# Patient Record
Sex: FEMALE | Race: WHITE | Employment: UNEMPLOYED | URBAN - METROPOLITAN AREA
[De-identification: names, ages, dates, MRNs, and addresses within clinical notes are randomized per-mention and may not be internally consistent; named-entity substitution may affect disease eponyms.]

---

## 2017-05-15 ENCOUNTER — APPOINTMENT (OUTPATIENT)
Dept: CT IMAGING | Age: 52
End: 2017-05-15
Attending: EMERGENCY MEDICINE
Payer: SELF-PAY

## 2017-05-15 ENCOUNTER — HOSPITAL ENCOUNTER (EMERGENCY)
Age: 52
Discharge: HOME | End: 2017-05-16
Attending: EMERGENCY MEDICINE | Admitting: EMERGENCY MEDICINE
Payer: SELF-PAY

## 2017-05-15 DIAGNOSIS — R11.10 VOMITING AND DIARRHEA: Primary | ICD-10-CM

## 2017-05-15 DIAGNOSIS — R19.7 VOMITING AND DIARRHEA: Primary | ICD-10-CM

## 2017-05-15 LAB
ALBUMIN SERPL BCP-MCNC: 4.5 G/DL (ref 3.5–5)
ALBUMIN/GLOB SERPL: 0.9 {RATIO} (ref 1.1–2.2)
ALP SERPL-CCNC: 97 U/L (ref 45–117)
ALT SERPL-CCNC: 48 U/L (ref 12–78)
ANION GAP BLD CALC-SCNC: 12 MMOL/L (ref 5–15)
AST SERPL W P-5'-P-CCNC: 28 U/L (ref 15–37)
BASOPHILS # BLD AUTO: 0 K/UL (ref 0–0.1)
BASOPHILS # BLD: 0 % (ref 0–1)
BILIRUB SERPL-MCNC: 0.4 MG/DL (ref 0.2–1)
BUN SERPL-MCNC: 17 MG/DL (ref 6–20)
BUN/CREAT SERPL: 18 (ref 12–20)
CALCIUM SERPL-MCNC: 9.5 MG/DL (ref 8.5–10.1)
CHLORIDE SERPL-SCNC: 100 MMOL/L (ref 97–108)
CO2 SERPL-SCNC: 26 MMOL/L (ref 21–32)
CREAT SERPL-MCNC: 0.94 MG/DL (ref 0.55–1.02)
EOSINOPHIL # BLD: 0 K/UL (ref 0–0.4)
EOSINOPHIL NFR BLD: 0 % (ref 0–7)
ERYTHROCYTE [DISTWIDTH] IN BLOOD BY AUTOMATED COUNT: 13.5 % (ref 11.5–14.5)
GLOBULIN SER CALC-MCNC: 5 G/DL (ref 2–4)
GLUCOSE SERPL-MCNC: 136 MG/DL (ref 65–100)
HCT VFR BLD AUTO: 53.9 % (ref 35–47)
HGB BLD-MCNC: 18.1 G/DL (ref 11.5–16)
LIPASE SERPL-CCNC: 162 U/L (ref 73–393)
LYMPHOCYTES # BLD AUTO: 11 % (ref 12–49)
LYMPHOCYTES # BLD: 0.8 K/UL (ref 0.8–3.5)
MCH RBC QN AUTO: 31.7 PG (ref 26–34)
MCHC RBC AUTO-ENTMCNC: 33.6 G/DL (ref 30–36.5)
MCV RBC AUTO: 94.4 FL (ref 80–99)
MONOCYTES # BLD: 0.2 K/UL (ref 0–1)
MONOCYTES NFR BLD AUTO: 2 % (ref 5–13)
NEUTS SEG # BLD: 6.5 K/UL (ref 1.8–8)
NEUTS SEG NFR BLD AUTO: 87 % (ref 32–75)
PLATELET # BLD AUTO: 311 K/UL (ref 150–400)
POTASSIUM SERPL-SCNC: 3.3 MMOL/L (ref 3.5–5.1)
PROT SERPL-MCNC: 9.5 G/DL (ref 6.4–8.2)
RBC # BLD AUTO: 5.71 M/UL (ref 3.8–5.2)
SODIUM SERPL-SCNC: 138 MMOL/L (ref 136–145)
WBC # BLD AUTO: 7.5 K/UL (ref 3.6–11)

## 2017-05-15 PROCEDURE — 74176 CT ABD & PELVIS W/O CONTRAST: CPT

## 2017-05-15 PROCEDURE — 96365 THER/PROPH/DIAG IV INF INIT: CPT

## 2017-05-15 PROCEDURE — 96375 TX/PRO/DX INJ NEW DRUG ADDON: CPT

## 2017-05-15 PROCEDURE — 96361 HYDRATE IV INFUSION ADD-ON: CPT

## 2017-05-15 PROCEDURE — 74011250636 HC RX REV CODE- 250/636: Performed by: EMERGENCY MEDICINE

## 2017-05-15 PROCEDURE — 83690 ASSAY OF LIPASE: CPT | Performed by: EMERGENCY MEDICINE

## 2017-05-15 PROCEDURE — 85025 COMPLETE CBC W/AUTO DIFF WBC: CPT | Performed by: EMERGENCY MEDICINE

## 2017-05-15 PROCEDURE — 99285 EMERGENCY DEPT VISIT HI MDM: CPT

## 2017-05-15 PROCEDURE — 74011250637 HC RX REV CODE- 250/637: Performed by: EMERGENCY MEDICINE

## 2017-05-15 PROCEDURE — 80053 COMPREHEN METABOLIC PANEL: CPT | Performed by: EMERGENCY MEDICINE

## 2017-05-15 PROCEDURE — 74011000250 HC RX REV CODE- 250: Performed by: EMERGENCY MEDICINE

## 2017-05-15 PROCEDURE — 36415 COLL VENOUS BLD VENIPUNCTURE: CPT | Performed by: EMERGENCY MEDICINE

## 2017-05-15 RX ORDER — FAMOTIDINE 20 MG/1
20 TABLET, FILM COATED ORAL 2 TIMES DAILY
Qty: 20 TAB | Refills: 0 | Status: SHIPPED | OUTPATIENT
Start: 2017-05-15 | End: 2017-05-25

## 2017-05-15 RX ORDER — PROMETHAZINE HYDROCHLORIDE 25 MG/1
25 TABLET ORAL
Qty: 12 TAB | Refills: 0 | Status: SHIPPED | OUTPATIENT
Start: 2017-05-15

## 2017-05-15 RX ORDER — FAMOTIDINE 10 MG/ML
20 INJECTION INTRAVENOUS
Status: DISCONTINUED | OUTPATIENT
Start: 2017-05-15 | End: 2017-05-15 | Stop reason: SDUPTHER

## 2017-05-15 RX ORDER — ONDANSETRON 4 MG/1
8 TABLET, ORALLY DISINTEGRATING ORAL
Status: COMPLETED | OUTPATIENT
Start: 2017-05-15 | End: 2017-05-15

## 2017-05-15 RX ADMIN — SODIUM CHLORIDE 1000 ML: 900 INJECTION, SOLUTION INTRAVENOUS at 21:25

## 2017-05-15 RX ADMIN — FAMOTIDINE 20 MG: 10 INJECTION, SOLUTION INTRAVENOUS at 21:27

## 2017-05-15 RX ADMIN — SODIUM CHLORIDE 1000 ML: 900 INJECTION, SOLUTION INTRAVENOUS at 23:34

## 2017-05-15 RX ADMIN — ONDANSETRON 8 MG: 4 TABLET, ORALLY DISINTEGRATING ORAL at 19:00

## 2017-05-15 RX ADMIN — PROMETHAZINE HYDROCHLORIDE 25 MG: 25 INJECTION INTRAMUSCULAR; INTRAVENOUS at 21:36

## 2017-05-15 NOTE — ED TRIAGE NOTES
She says she ate \"some bad chicken\" this morning and started vomiting about an hour later. She has also had some diarrhea. She does not sit up and is spitting into the emesis bag. She is hypertensive.

## 2017-05-16 VITALS
BODY MASS INDEX: 28.47 KG/M2 | TEMPERATURE: 98.5 F | HEART RATE: 122 BPM | SYSTOLIC BLOOD PRESSURE: 197 MMHG | HEIGHT: 60 IN | RESPIRATION RATE: 18 BRPM | DIASTOLIC BLOOD PRESSURE: 124 MMHG | OXYGEN SATURATION: 100 % | WEIGHT: 145 LBS

## 2017-05-16 LAB
ATRIAL RATE: 114 BPM
CALCULATED P AXIS, ECG09: 71 DEGREES
CALCULATED R AXIS, ECG10: 70 DEGREES
CALCULATED T AXIS, ECG11: 74 DEGREES
DIAGNOSIS, 93000: NORMAL
P-R INTERVAL, ECG05: 136 MS
Q-T INTERVAL, ECG07: 364 MS
QRS DURATION, ECG06: 76 MS
QTC CALCULATION (BEZET), ECG08: 501 MS
VENTRICULAR RATE, ECG03: 114 BPM

## 2017-05-16 PROCEDURE — 93005 ELECTROCARDIOGRAM TRACING: CPT

## 2017-05-16 NOTE — ED NOTES
Pt. States her pain is 7/10 and requests pain medication. Pt. Very drowsy. Dr. Elis Oswald aware of pt.'s request but due to her drowsy will not be ordering pain medication. Went to inform pt. And pt. Was asleep.

## 2017-05-16 NOTE — DISCHARGE INSTRUCTIONS
Diarrhea: Care Instructions  Your Care Instructions    Diarrhea is loose, watery stools (bowel movements). The exact cause is often hard to find. Sometimes diarrhea is your body's way of getting rid of what caused an upset stomach. Viruses, food poisoning, and many medicines can cause diarrhea. Some people get diarrhea in response to emotional stress, anxiety, or certain foods. Almost everyone has diarrhea now and then. It usually isn't serious, and your stools will return to normal soon. The important thing to do is replace the fluids you have lost, so you can prevent dehydration. The doctor has checked you carefully, but problems can develop later. If you notice any problems or new symptoms, get medical treatment right away. Follow-up care is a key part of your treatment and safety. Be sure to make and go to all appointments, and call your doctor if you are having problems. It's also a good idea to know your test results and keep a list of the medicines you take. How can you care for yourself at home? · Watch for signs of dehydration, which means your body has lost too much water. Dehydration is a serious condition and should be treated right away. Signs of dehydration are:  ¨ Increasing thirst and dry eyes and mouth. ¨ Feeling faint or lightheaded. ¨ Darker urine, and a smaller amount of urine than normal.  · To prevent dehydration, drink plenty of fluids, enough so that your urine is light yellow or clear like water. Choose water and other caffeine-free clear liquids until you feel better. If you have kidney, heart, or liver disease and have to limit fluids, talk with your doctor before you increase the amount of fluids you drink. · Begin eating small amounts of mild foods the next day, if you feel like it. ¨ Try yogurt that has live cultures of Lactobacillus. (Check the label.)  ¨ Avoid spicy foods, fruits, alcohol, and caffeine until 48 hours after all symptoms are gone.   ¨ Avoid chewing gum that contains sorbitol. ¨ Avoid dairy products (except for yogurt with Lactobacillus) while you have diarrhea and for 3 days after symptoms are gone. · The doctor may recommend that you take over-the-counter medicine, such as loperamide (Imodium), if you still have diarrhea after 6 hours. Read and follow all instructions on the label. Do not use this medicine if you have bloody diarrhea, a high fever, or other signs of serious illness. Call your doctor if you think you are having a problem with your medicine. When should you call for help? Call 911 anytime you think you may need emergency care. For example, call if:  · You passed out (lost consciousness). · Your stools are maroon or very bloody. Call your doctor now or seek immediate medical care if:  · You are dizzy or lightheaded, or you feel like you may faint. · Your stools are black and look like tar, or they have streaks of blood. · You have new or worse belly pain. · You have symptoms of dehydration, such as:  ¨ Dry eyes and a dry mouth. ¨ Passing only a little dark urine. ¨ Feeling thirstier than usual.  · You have a new or higher fever. Watch closely for changes in your health, and be sure to contact your doctor if:  · Your diarrhea is getting worse. · You see pus in the diarrhea. · You are not getting better after 2 days (48 hours). Where can you learn more? Go to http://madie-bryanna.info/. Enter Q800 in the search box to learn more about \"Diarrhea: Care Instructions. \"  Current as of: May 27, 2016  Content Version: 11.2  © 9365-2582 [employer]. Care instructions adapted under license by [employer] (which disclaims liability or warranty for this information). If you have questions about a medical condition or this instruction, always ask your healthcare professional. [address] any warranty or liability for your use of this information.          Nausea and Vomiting: Care Instructions  Your Care Instructions    When you are nauseated, you may feel weak and sweaty and notice a lot of saliva in your mouth. Nausea often leads to vomiting. Most of the time you do not need to worry about nausea and vomiting, but they can be signs of other illnesses. Two common causes of nausea and vomiting are stomach flu and food poisoning. Nausea and vomiting from viral stomach flu will usually start to improve within 24 hours. Nausea and vomiting from food poisoning may last from 12 to 48 hours. The doctor has checked you carefully, but problems can develop later. If you notice any problems or new symptoms, get medical treatment right away. Follow-up care is a key part of your treatment and safety. Be sure to make and go to all appointments, and call your doctor if you are having problems. It's also a good idea to know your test results and keep a list of the medicines you take. How can you care for yourself at home? · To prevent dehydration, drink plenty of fluids, enough so that your urine is light yellow or clear like water. Choose water and other caffeine-free clear liquids until you feel better. If you have kidney, heart, or liver disease and have to limit fluids, talk with your doctor before you increase the amount of fluids you drink. · Rest in bed until you feel better. · When you are able to eat, try clear soups, mild foods, and liquids until all symptoms are gone for 12 to 48 hours. Other good choices include dry toast, crackers, cooked cereal, and gelatin dessert, such as Jell-O. When should you call for help? Call 911 anytime you think you may need emergency care. For example, call if:  · You passed out (lost consciousness). Call your doctor now or seek immediate medical care if:  · You have symptoms of dehydration, such as:  ¨ Dry eyes and a dry mouth. ¨ Passing only a little dark urine. ¨ Feeling thirstier than usual.  · You have new or worsening belly pain.   · You have a new or higher fever. · You vomit blood or what looks like coffee grounds. Watch closely for changes in your health, and be sure to contact your doctor if:  · You have ongoing nausea and vomiting. · Your vomiting is getting worse. · Your vomiting lasts longer than 2 days. · You are not getting better as expected. Where can you learn more? Go to http://madie-bryanna.info/. Enter 25 [id number] in the search box to learn more about \"Nausea and Vomiting: Care Instructions. \"  Current as of: May 27, 2016  Content Version: 11.2  © 9127-9957 [employer]. Care instructions adapted under license by [employer] (which disclaims liability or warranty for this information). If you have questions about a medical condition or this instruction, always ask your healthcare professional. [address] any warranty or liability for your use of this information.

## 2017-05-16 NOTE — ED NOTES
Pt. Remains tachycardic with elevated BP. Pt. Still drowsy. Pt. Admits to using cocaine prior to coming to ED.  notified of elevated BP and HR. Dr. Ortiz Bile in to talk with pt.

## 2017-05-16 NOTE — ED PROVIDER NOTES
HPI Comments: 45 y.o. female with past medical history significant for HTN who presents from home with chief complaint of N/V/D. Pt reports she \"ate bad chicken\" earlier this morning and became ill w/ N/V/D and 8/10 epigastric abdominal pain 30 minutes later. She states she took [address] with no relief. Pt remained quiet after many questions. She denies hx of DM or cancer. There are no other acute medical concerns at this time. Note written by Peng Mclaughlin, as dictated by Kate Parra MD 8:48 PM    The history is provided by the patient. Past Medical History:   Diagnosis Date    Hypertension        History reviewed. No pertinent surgical history. No family history on file. Social History     Social History    Marital status: SINGLE     Spouse name: N/A    Number of children: N/A    Years of education: N/A     Occupational History    Not on file. Social History Main Topics    Smoking status: Light Tobacco Smoker    Smokeless tobacco: Not on file    Alcohol use No    Drug use: Not on file    Sexual activity: Not on file     Other Topics Concern    Not on file     Social History Narrative    No narrative on file         ALLERGIES: Compazine [prochlorperazine]    Review of Systems   Constitutional: Negative for fever. HENT: Negative for facial swelling. Eyes: Negative for visual disturbance. Respiratory: Negative for chest tightness. Cardiovascular: Negative for chest pain. Gastrointestinal: Positive for abdominal pain, diarrhea, nausea and vomiting. Genitourinary: Negative for dysuria. Musculoskeletal: Negative for arthralgias. Skin: Negative for rash. Neurological: Negative for dizziness. Hematological: Negative for adenopathy. Psychiatric/Behavioral: Negative for suicidal ideas. All other systems reviewed and are negative.       Vitals:    05/15/17 1821   BP: (!) 194/121   Pulse: 100   Resp: 24   Temp: 98.9 °F (37.2 °C)   SpO2: 97%   Weight: 65.8 kg (145 lb)   Height: 5' (1.524 m)            Physical Exam   Constitutional: She is oriented to person, place, and time. She appears well-developed and well-nourished. No distress. Dry heaving. HENT:   Head: Normocephalic and atraumatic. Mouth/Throat: Oropharynx is clear and moist.   Eyes: Pupils are equal, round, and reactive to light. No scleral icterus. Neck: Normal range of motion. Neck supple. No thyromegaly present. Cardiovascular: Regular rhythm, normal heart sounds and intact distal pulses. Tachycardia present. No murmur heard. Pulmonary/Chest: Effort normal and breath sounds normal. No respiratory distress. Abdominal: Soft. Bowel sounds are normal. She exhibits no distension. There is no tenderness. Musculoskeletal: Normal range of motion. She exhibits no edema. Neurological: She is alert and oriented to person, place, and time. Skin: Skin is warm and dry. No rash noted. She is not diaphoretic. Nursing note and vitals reviewed. Note written by Peng Mclaughlin, as dictated by Kate Parra MD 8:48 PM    MDM  Number of Diagnoses or Management Options  Diagnosis management comments: A:  Vomiting and diarrhea all day since eating chicken this AM.  Pt thinks she has food poisoning. Pt tachycardic and dry heaving. Soft abdomen. DDx; Food poisoning, gastroenteritis, obstruction, gastroparesis    P:  Labs  ivf  Phenergan (pt states this is what works for her). CT a/p    ED Course       Procedures    WBC=7.5 with some hemoconcentration. CMP unremarkable. Lipase normal    10:24 PM  Pt signed out to Dr. Harris Said pending repeat assessment following IVF and CT a/p.

## 2017-05-16 NOTE — ED NOTES
11:19 PM  Change of shift. Care of patient signed over to Dr. Vaughn Santos. Handoff complete. PROGRESS NOTE:  11:20 PM Pt is slightly drowsy but awakes to voice. Her nausea is better with a little abdominal pain. Per Dr. Riccardo Morrison, we will discharge her with a close follow up. PROGRESS NOTE:  12:44 AM The pt admits to using cocaine before presenting to the ED. PROGRESS NOTE:  12:57 AM I spoke with the pt and said that I will not give her BP medication since she took cocaine. I advised her to follow up with her PCP. She is refusing to leave and she said that she will call the police if we try to discharge her.

## 2017-05-16 NOTE — ED NOTES
On entering room to discharge pt. Pt. States \"I am not going home with my BP elevated. \" Dr. Thony Britton spoke with pt. Informing her that she needed to F/U with her PCP regarding her BP that he was not going to treat her BP ion the Ed with cocaine on board. Pt. Angry but dressed self and ambulated to waiting room to call for a ride.

## 2020-03-10 ENCOUNTER — APPOINTMENT (OUTPATIENT)
Dept: GENERAL RADIOLOGY | Age: 55
End: 2020-03-10
Attending: EMERGENCY MEDICINE
Payer: MEDICAID

## 2020-03-10 ENCOUNTER — APPOINTMENT (OUTPATIENT)
Dept: CT IMAGING | Age: 55
End: 2020-03-10
Attending: EMERGENCY MEDICINE
Payer: MEDICAID

## 2020-03-10 ENCOUNTER — HOSPITAL ENCOUNTER (OUTPATIENT)
Age: 55
Setting detail: OBSERVATION
LOS: 1 days | Discharge: HOME | End: 2020-03-13
Attending: EMERGENCY MEDICINE | Admitting: INTERNAL MEDICINE
Payer: MEDICAID

## 2020-03-10 DIAGNOSIS — R07.9 CHEST PAIN, UNSPECIFIED TYPE: Primary | ICD-10-CM

## 2020-03-10 DIAGNOSIS — R11.2 INTRACTABLE VOMITING WITH NAUSEA, UNSPECIFIED VOMITING TYPE: ICD-10-CM

## 2020-03-10 DIAGNOSIS — E87.6 HYPOKALEMIA: ICD-10-CM

## 2020-03-10 LAB
ALBUMIN SERPL-MCNC: 4.1 G/DL (ref 3.5–5)
ALBUMIN/GLOB SERPL: 0.9 {RATIO} (ref 1.1–2.2)
ALP SERPL-CCNC: 109 U/L (ref 45–117)
ALT SERPL-CCNC: 29 U/L (ref 12–78)
AMPHET UR QL SCN: NEGATIVE
ANION GAP SERPL CALC-SCNC: 5 MMOL/L (ref 5–15)
AST SERPL-CCNC: 23 U/L (ref 15–37)
BARBITURATES UR QL SCN: NEGATIVE
BASOPHILS # BLD: 0 K/UL (ref 0–0.1)
BASOPHILS NFR BLD: 0 % (ref 0–1)
BENZODIAZ UR QL: NEGATIVE
BILIRUB SERPL-MCNC: 0.3 MG/DL (ref 0.2–1)
BUN SERPL-MCNC: 15 MG/DL (ref 6–20)
BUN/CREAT SERPL: 18 (ref 12–20)
CALCIUM SERPL-MCNC: 9.5 MG/DL (ref 8.5–10.1)
CANNABINOIDS UR QL SCN: NEGATIVE
CHLORIDE SERPL-SCNC: 102 MMOL/L (ref 97–108)
CO2 SERPL-SCNC: 28 MMOL/L (ref 21–32)
COCAINE UR QL SCN: POSITIVE
COMMENT, HOLDF: NORMAL
CREAT SERPL-MCNC: 0.85 MG/DL (ref 0.55–1.02)
D DIMER PPP FEU-MCNC: 0.64 MG/L FEU (ref 0–0.65)
DIFFERENTIAL METHOD BLD: ABNORMAL
DRUG SCRN COMMENT,DRGCM: ABNORMAL
EOSINOPHIL # BLD: 0 K/UL (ref 0–0.4)
EOSINOPHIL NFR BLD: 0 % (ref 0–7)
ERYTHROCYTE [DISTWIDTH] IN BLOOD BY AUTOMATED COUNT: 13.6 % (ref 11.5–14.5)
GLOBULIN SER CALC-MCNC: 4.7 G/DL (ref 2–4)
GLUCOSE SERPL-MCNC: 156 MG/DL (ref 65–100)
HCT VFR BLD AUTO: 54.6 % (ref 35–47)
HGB BLD-MCNC: 17.2 G/DL (ref 11.5–16)
IMM GRANULOCYTES # BLD AUTO: 0 K/UL (ref 0–0.04)
IMM GRANULOCYTES NFR BLD AUTO: 1 % (ref 0–0.5)
LYMPHOCYTES # BLD: 2 K/UL (ref 0.8–3.5)
LYMPHOCYTES NFR BLD: 24 % (ref 12–49)
MCH RBC QN AUTO: 30.6 PG (ref 26–34)
MCHC RBC AUTO-ENTMCNC: 31.5 G/DL (ref 30–36.5)
MCV RBC AUTO: 97.2 FL (ref 80–99)
METHADONE UR QL: NEGATIVE
MONOCYTES # BLD: 0.3 K/UL (ref 0–1)
MONOCYTES NFR BLD: 4 % (ref 5–13)
NEUTS SEG # BLD: 6 K/UL (ref 1.8–8)
NEUTS SEG NFR BLD: 71 % (ref 32–75)
NRBC # BLD: 0 K/UL (ref 0–0.01)
NRBC BLD-RTO: 0 PER 100 WBC
OPIATES UR QL: NEGATIVE
PCP UR QL: NEGATIVE
PLATELET # BLD AUTO: 334 K/UL (ref 150–400)
PMV BLD AUTO: 9 FL (ref 8.9–12.9)
POTASSIUM SERPL-SCNC: 2.9 MMOL/L (ref 3.5–5.1)
PROT SERPL-MCNC: 8.8 G/DL (ref 6.4–8.2)
RBC # BLD AUTO: 5.62 M/UL (ref 3.8–5.2)
SAMPLES BEING HELD,HOLD: NORMAL
SODIUM SERPL-SCNC: 135 MMOL/L (ref 136–145)
TROPONIN I SERPL-MCNC: <0.05 NG/ML
TROPONIN I SERPL-MCNC: <0.05 NG/ML
UR CULT HOLD, URHOLD: NORMAL
WBC # BLD AUTO: 8.4 K/UL (ref 3.6–11)

## 2020-03-10 PROCEDURE — 93005 ELECTROCARDIOGRAM TRACING: CPT

## 2020-03-10 PROCEDURE — 74011250637 HC RX REV CODE- 250/637: Performed by: NURSE PRACTITIONER

## 2020-03-10 PROCEDURE — 74011250637 HC RX REV CODE- 250/637: Performed by: INTERNAL MEDICINE

## 2020-03-10 PROCEDURE — 80307 DRUG TEST PRSMV CHEM ANLYZR: CPT

## 2020-03-10 PROCEDURE — 74011250636 HC RX REV CODE- 250/636: Performed by: INTERNAL MEDICINE

## 2020-03-10 PROCEDURE — 74011000258 HC RX REV CODE- 258: Performed by: EMERGENCY MEDICINE

## 2020-03-10 PROCEDURE — 99285 EMERGENCY DEPT VISIT HI MDM: CPT

## 2020-03-10 PROCEDURE — 74011250636 HC RX REV CODE- 250/636: Performed by: EMERGENCY MEDICINE

## 2020-03-10 PROCEDURE — 96361 HYDRATE IV INFUSION ADD-ON: CPT

## 2020-03-10 PROCEDURE — 99218 HC RM OBSERVATION: CPT

## 2020-03-10 PROCEDURE — 85379 FIBRIN DEGRADATION QUANT: CPT

## 2020-03-10 PROCEDURE — 84484 ASSAY OF TROPONIN QUANT: CPT

## 2020-03-10 PROCEDURE — 74011000258 HC RX REV CODE- 258: Performed by: NURSE PRACTITIONER

## 2020-03-10 PROCEDURE — 36415 COLL VENOUS BLD VENIPUNCTURE: CPT

## 2020-03-10 PROCEDURE — 71045 X-RAY EXAM CHEST 1 VIEW: CPT

## 2020-03-10 PROCEDURE — 80053 COMPREHEN METABOLIC PANEL: CPT

## 2020-03-10 PROCEDURE — 96375 TX/PRO/DX INJ NEW DRUG ADDON: CPT

## 2020-03-10 PROCEDURE — 74011000250 HC RX REV CODE- 250: Performed by: NURSE PRACTITIONER

## 2020-03-10 PROCEDURE — 96365 THER/PROPH/DIAG IV INF INIT: CPT

## 2020-03-10 PROCEDURE — 96372 THER/PROPH/DIAG INJ SC/IM: CPT

## 2020-03-10 PROCEDURE — 96376 TX/PRO/DX INJ SAME DRUG ADON: CPT

## 2020-03-10 PROCEDURE — 74011250636 HC RX REV CODE- 250/636: Performed by: NURSE PRACTITIONER

## 2020-03-10 PROCEDURE — 74011250637 HC RX REV CODE- 250/637: Performed by: EMERGENCY MEDICINE

## 2020-03-10 PROCEDURE — 85025 COMPLETE CBC W/AUTO DIFF WBC: CPT

## 2020-03-10 RX ORDER — ENOXAPARIN SODIUM 100 MG/ML
1 INJECTION SUBCUTANEOUS EVERY 24 HOURS
Status: DISCONTINUED | OUTPATIENT
Start: 2020-03-10 | End: 2020-03-10

## 2020-03-10 RX ORDER — MORPHINE SULFATE 10 MG/ML
6 INJECTION, SOLUTION INTRAMUSCULAR; INTRAVENOUS
Status: COMPLETED | OUTPATIENT
Start: 2020-03-10 | End: 2020-03-10

## 2020-03-10 RX ORDER — ONDANSETRON 2 MG/ML
4 INJECTION INTRAMUSCULAR; INTRAVENOUS
Status: DISCONTINUED | OUTPATIENT
Start: 2020-03-10 | End: 2020-03-13 | Stop reason: HOSPADM

## 2020-03-10 RX ORDER — ONDANSETRON 2 MG/ML
4 INJECTION INTRAMUSCULAR; INTRAVENOUS
Status: COMPLETED | OUTPATIENT
Start: 2020-03-10 | End: 2020-03-10

## 2020-03-10 RX ORDER — METOPROLOL TARTRATE 5 MG/5ML
5 INJECTION INTRAVENOUS ONCE
Status: DISCONTINUED | OUTPATIENT
Start: 2020-03-10 | End: 2020-03-10

## 2020-03-10 RX ORDER — ASPIRIN 325 MG
325 TABLET ORAL
Status: COMPLETED | OUTPATIENT
Start: 2020-03-10 | End: 2020-03-10

## 2020-03-10 RX ORDER — AMLODIPINE BESYLATE 5 MG/1
10 TABLET ORAL
Status: COMPLETED | OUTPATIENT
Start: 2020-03-10 | End: 2020-03-10

## 2020-03-10 RX ORDER — POTASSIUM CHLORIDE 7.45 MG/ML
10 INJECTION INTRAVENOUS
Status: COMPLETED | OUTPATIENT
Start: 2020-03-10 | End: 2020-03-10

## 2020-03-10 RX ORDER — DILTIAZEM HYDROCHLORIDE 5 MG/ML
10 INJECTION INTRAVENOUS ONCE
Status: COMPLETED | OUTPATIENT
Start: 2020-03-11 | End: 2020-03-10

## 2020-03-10 RX ORDER — ENOXAPARIN SODIUM 100 MG/ML
40 INJECTION SUBCUTANEOUS EVERY 24 HOURS
Status: DISCONTINUED | OUTPATIENT
Start: 2020-03-11 | End: 2020-03-13 | Stop reason: HOSPADM

## 2020-03-10 RX ORDER — KETOROLAC TROMETHAMINE 30 MG/ML
30 INJECTION, SOLUTION INTRAMUSCULAR; INTRAVENOUS
Status: COMPLETED | OUTPATIENT
Start: 2020-03-10 | End: 2020-03-10

## 2020-03-10 RX ORDER — MORPHINE SULFATE 2 MG/ML
2 INJECTION, SOLUTION INTRAMUSCULAR; INTRAVENOUS ONCE
Status: COMPLETED | OUTPATIENT
Start: 2020-03-10 | End: 2020-03-10

## 2020-03-10 RX ORDER — CARVEDILOL 6.25 MG/1
6.25 TABLET ORAL 2 TIMES DAILY WITH MEALS
Status: DISCONTINUED | OUTPATIENT
Start: 2020-03-10 | End: 2020-03-13

## 2020-03-10 RX ORDER — SODIUM CHLORIDE 0.9 % (FLUSH) 0.9 %
10 SYRINGE (ML) INJECTION
Status: ACTIVE | OUTPATIENT
Start: 2020-03-10 | End: 2020-03-11

## 2020-03-10 RX ORDER — DIAZEPAM 10 MG/2ML
5 INJECTION INTRAMUSCULAR
Status: DISCONTINUED | OUTPATIENT
Start: 2020-03-10 | End: 2020-03-13 | Stop reason: HOSPADM

## 2020-03-10 RX ORDER — HYDRALAZINE HYDROCHLORIDE 20 MG/ML
10 INJECTION INTRAMUSCULAR; INTRAVENOUS
Status: DISCONTINUED | OUTPATIENT
Start: 2020-03-10 | End: 2020-03-10

## 2020-03-10 RX ORDER — POTASSIUM CHLORIDE 750 MG/1
40 TABLET, FILM COATED, EXTENDED RELEASE ORAL
Status: COMPLETED | OUTPATIENT
Start: 2020-03-10 | End: 2020-03-10

## 2020-03-10 RX ORDER — CLONIDINE 0.2 MG/24H
1 PATCH, EXTENDED RELEASE TRANSDERMAL
Status: DISCONTINUED | OUTPATIENT
Start: 2020-03-10 | End: 2020-03-13 | Stop reason: HOSPADM

## 2020-03-10 RX ORDER — SODIUM CHLORIDE 9 MG/ML
125 INJECTION, SOLUTION INTRAVENOUS CONTINUOUS
Status: DISCONTINUED | OUTPATIENT
Start: 2020-03-10 | End: 2020-03-12

## 2020-03-10 RX ADMIN — KETOROLAC TROMETHAMINE 30 MG: 30 INJECTION, SOLUTION INTRAMUSCULAR at 12:14

## 2020-03-10 RX ADMIN — SODIUM CHLORIDE 125 ML/HR: 900 INJECTION, SOLUTION INTRAVENOUS at 18:34

## 2020-03-10 RX ADMIN — HYDRALAZINE HYDROCHLORIDE 10 MG: 20 INJECTION INTRAMUSCULAR; INTRAVENOUS at 19:27

## 2020-03-10 RX ADMIN — CARVEDILOL 6.25 MG: 6.25 TABLET, FILM COATED ORAL at 17:13

## 2020-03-10 RX ADMIN — SODIUM CHLORIDE 1000 ML: 900 INJECTION, SOLUTION INTRAVENOUS at 15:33

## 2020-03-10 RX ADMIN — ASPIRIN 325 MG ORAL TABLET 325 MG: 325 PILL ORAL at 11:09

## 2020-03-10 RX ADMIN — MORPHINE SULFATE 2 MG: 2 INJECTION, SOLUTION INTRAMUSCULAR; INTRAVENOUS at 23:07

## 2020-03-10 RX ADMIN — POTASSIUM CHLORIDE 40 MEQ: 750 TABLET, FILM COATED, EXTENDED RELEASE ORAL at 13:39

## 2020-03-10 RX ADMIN — ONDANSETRON 4 MG: 2 INJECTION, SOLUTION INTRAMUSCULAR; INTRAVENOUS at 11:07

## 2020-03-10 RX ADMIN — NITROGLYCERIN 1 INCH: 20 OINTMENT TOPICAL at 23:40

## 2020-03-10 RX ADMIN — ONDANSETRON 4 MG: 2 INJECTION INTRAMUSCULAR; INTRAVENOUS at 19:27

## 2020-03-10 RX ADMIN — PROMETHAZINE HYDROCHLORIDE 25 MG: 25 INJECTION INTRAMUSCULAR; INTRAVENOUS at 15:44

## 2020-03-10 RX ADMIN — POTASSIUM CHLORIDE 10 MEQ: 10 INJECTION, SOLUTION INTRAVENOUS at 12:14

## 2020-03-10 RX ADMIN — AMLODIPINE BESYLATE 10 MG: 5 TABLET ORAL at 17:13

## 2020-03-10 RX ADMIN — DILTIAZEM HYDROCHLORIDE 10 MG/HR: 5 INJECTION INTRAVENOUS at 23:19

## 2020-03-10 RX ADMIN — DILTIAZEM HYDROCHLORIDE 10 MG: 5 INJECTION INTRAVENOUS at 23:18

## 2020-03-10 RX ADMIN — MORPHINE SULFATE 6 MG: 10 INJECTION INTRAVENOUS at 15:36

## 2020-03-10 NOTE — ROUTINE PROCESS
TRANSFER - OUT REPORT: 
 
Verbal report given to Carlos Guardado RN (name) on Tommie Watson  being transferred to 4W room 437 (unit) for routine progression of care Report consisted of patients Situation, Background, Assessment and  
Recommendations(SBAR). Information from the following report(s) SBAR and ED Summary was reviewed with the receiving nurse. Lines:    
 
Opportunity for questions and clarification was provided. Patient transported with: 
 [employer]

## 2020-03-10 NOTE — ROUTINE PROCESS
1845: Patient arrived from ER. Had a large emesis. Checked Vitals. Patient's BP is 174/120. Notified Dr. Jaswinder Dubon through [facility]. Awaiting for new orders. 1900: Patient sleeping, will hold antiemetic for now. 1915: Spoke with JOSE ARMANDO Larios and received new order of PRN hydralazine. 1927: PRN hydralazine and zofran given. 2008: BP rechecked, 160/103. Bedside and Verbal shift change report given to Norman Murphy (oncoming nurse) by Rosalba Kebede (offgoing nurse). Report included the following information SBAR, Kardex, Intake/Output, MAR, Recent Results, Med Rec Status and Cardiac Rhythm ST.

## 2020-03-10 NOTE — ED TRIAGE NOTES
Triage Note: Patient is coming in from EMS with complaints of chest pressure since 0600 this morning. Patient received 1 SL NTG and 1 inch NTP en route to the hospital. Patient has elevated blood pressure but has not taken medicine this morning. Patient is a poor historian. Pt also admits to recent cocaine use.

## 2020-03-10 NOTE — ED PROVIDER NOTES
53 y.o. female with past medical history significant for HTN, and MI who presents via EMS with chief complaint of chest pain. Pt complains of sudden onset L chest pain that feels like pressure, and radiates to both of her underarms, that began this morning at 06:00. Her pain is worse with palpation, and she states this pain feels similar to her previous MI 6 years ago. Pt does not follow with a cardiologist at this time, but states she was seen for her MI at [facility]. She also endorses nausea, vomiting, shortness of breath, lightheadedness, dizziness, and urinary frequency. She denies leg pain, and leg swelling. She has a history of cocaine abuse, and states she last used it 2 days ago, and she does not use it regularly. Pt was hypertensive and diaphoretic for EMS, with pressure in the 221'W systolic, but was improved slightly with nitropaste. She did not receive aspirin form EMS. Pt is on amlodipine for her hypertension, and last took it yesterday. There are no other acute medical concerns at this time. Social hx: endorses tobacco smoking; denies EtOH use; endorses cocaine use. PCP: Rayna Jorgensen MD      Note written by Peng Zhu, as dictated by Veronica Warner MD 10:29 AM      The history is provided by the patient and the EMS personnel. No  was used. Past Medical History:   Diagnosis Date    Hypertension        History reviewed. No pertinent surgical history. History reviewed. No pertinent family history.     Social History     Socioeconomic History    Marital status: SINGLE     Spouse name: Not on file    Number of children: Not on file    Years of education: Not on file    Highest education level: Not on file   Occupational History    Not on file   Social Needs    Financial resource strain: Not on file    Food insecurity:     Worry: Not on file     Inability: Not on file    Transportation needs:     Medical: Not on file     Non-medical: Not on file Tobacco Use    Smoking status: Light Tobacco Smoker    Smokeless tobacco: Never Used   Substance and Sexual Activity    Alcohol use: No    Drug use: Not on file    Sexual activity: Not on file   Lifestyle    Physical activity:     Days per week: Not on file     Minutes per session: Not on file    Stress: Not on file   Relationships    Social connections:     Talks on phone: Not on file     Gets together: Not on file     Attends Yazidism service: Not on file     Active member of club or organization: Not on file     Attends meetings of clubs or organizations: Not on file     Relationship status: Not on file    Intimate partner violence:     Fear of current or ex partner: Not on file     Emotionally abused: Not on file     Physically abused: Not on file     Forced sexual activity: Not on file   Other Topics Concern    Not on file   Social History Narrative    Not on file         ALLERGIES: Compazine [prochlorperazine]    Review of Systems   Constitutional: Negative for chills and fever. HENT: Negative for rhinorrhea and sore throat. Respiratory: Positive for shortness of breath. Negative for cough. Cardiovascular: Positive for chest pain. Negative for leg swelling. Gastrointestinal: Positive for nausea. Negative for abdominal pain and diarrhea. Genitourinary: Positive for frequency. Negative for dysuria and urgency. Musculoskeletal: Negative for arthralgias and back pain. Skin: Negative for rash. Neurological: Positive for dizziness and light-headedness. Negative for weakness. Vitals:    03/10/20 1026   BP: (!) 148/97   Pulse: 79   Resp: 20   SpO2: 97%            Physical Exam     Vital signs reviewed. Nursing notes reviewed.     Const:  No acute distress, well developed, well nourished, uncomfortable appearing, pt retching during exam  Head:  Atraumatic, normocephalic  Eyes:  PERRL, conjunctiva normal, no scleral icterus  Neck:  Supple, trachea midline  Cardiovascular:  RRR, no murmurs, no gallops, no rubs  Resp:  No resp distress, no increased work of breathing, no wheezes, no rhonchi, no rales,  Abd:  Soft, non-tender, non-distended, no rebound, no guarding, no CVA tenderness  MSK:  No pedal edema, normal ROM, tenderness to palpation over L chest wall  Neuro:  Alert and oriented x3, no cranial nerve defect  Skin:  Warm, dry, intact  Psych: normal mood and affect, behavior is normal, judgement and thought content is normal    Note written by Alvie Spatz, Scribe, as dictated by Angie Munoz MD 10:36 AM    MDM  Number of Diagnoses or Management Options  Chest pain, unspecified type:   Hypokalemia:   Intractable vomiting with nausea, unspecified vomiting type:      Amount and/or Complexity of Data Reviewed  Clinical lab tests: ordered and reviewed  Tests in the radiology section of CPT®: ordered and reviewed  Review and summarize past medical records: yes    Patient Progress  Patient progress: stable          Ms. Pato Caldera is a 50-year-old female who presents to the ER with chest pain and intractable nausea vomiting. Patient found to have hyperkalemia. She continues to have pain in her chest here. She said that it feels like when she had an MI in the past.  First troponin is negative. She continues to be tachycardic with a heart rate in the 1 teens. Her d-dimer is elevated. We will try to get several CTs of her chest but her IVs of blood in each time. She has a history of difficult IV access. I will give her a dose of Lovenox here. I spoke with the hospitalist who agrees to evaluate her for admission.       Procedures

## 2020-03-10 NOTE — H&P
Brief:            H and P     CC:    Pt with cp and + cocain in screen    HPI: PT not participating in history ; only form ED notes and subsequent ed course is info gathered    \"53 y.o. female with past medical history significant for HTN, and MI who presents via EMS with chief complaint of chest pain. Pt complains of sudden onset L chest pain that feels like pressure, and radiates to both of her underarms, that began this morning at 06:00. Her pain is worse with palpation, and she states this pain feels similar to her previous MI 6 years ago. Pt does not follow with a cardiologist at this time, but states she was seen for her MI at [facility] [place]. She also endorses nausea, vomiting, shortness of breath, lightheadedness, dizziness, and urinary frequency. She denies leg pain, and leg swelling. She has a history of cocaine abuse, and states she last used it 2 days ago, and she does not use it regularly. Pt was hypertensive and diaphoretic for EMS, with pressure in the 220'B systolic, but was improved slightly with nitropaste. She did not receive aspirin form EMS. Pt is on amlodipine for her hypertension, and last took it yesterday. There are no other acute medical concerns at this time.     Social hx: endorses tobacco smoking; denies EtOH use; endorses cocaine use. \"     Pt up and walking x 2 in ED  with BP  in the 200 PLUS  range and pulse 120 range in ED going to bathroom to void. ECG and CXr and Troponin's do not point to major organ injury    At this junction symptom relief of N/V, CP and VS control,       Pt is a poor historian but claims last cocaine 2 days ago. Prior to Admission Med List  Prior to Admission Medications   Prescriptions Last Dose Informant Patient Reported? Taking?   promethazine (PHENERGAN) 25 mg tablet   No No   Sig: Take 1 Tab by mouth every six (6) hours as needed.       Facility-Administered Medications: None       SH:  Social History     Socioeconomic History    Marital status: SINGLE Spouse name: Not on file    Number of children: Not on file    Years of education: Not on file    Highest education level: Not on file   Occupational History    Not on file   Social Needs    Financial resource strain: Not on file    Food insecurity     Worry: Not on file     Inability: Not on file    Transportation needs     Medical: Not on file     Non-medical: Not on file   Tobacco Use    Smoking status: Light Tobacco Smoker    Smokeless tobacco: Never Used   Substance and Sexual Activity    Alcohol use: No    Drug use: Not on file    Sexual activity: Not on file   Lifestyle    Physical activity     Days per week: Not on file     Minutes per session: Not on file    Stress: Not on file   Relationships    Social connections     Talks on phone: Not on file     Gets together: Not on file     Attends Religion service: Not on file     Active member of club or organization: Not on file     Attends meetings of clubs or organizations: Not on file     Relationship status: Not on file    Intimate partner violence     Fear of current or ex partner: Not on file     Emotionally abused: Not on file     Physically abused: Not on file     Forced sexual activity: Not on file   Other Topics Concern    Not on file   Social History Narrative    Not on file          PMH:     Past Medical History:   Diagnosis Date    Hypertension         Medicine PTA:     Medications Prior to Admission   Medication Sig    promethazine (PHENERGAN) 25 mg tablet Take 1 Tab by mouth every six (6) hours as needed.          ROS:   Review of Systems   Reason unable to perform ROS: loopy, not answering questions except for a few words and these are not corrherrent          PE:     Visit Vitals  /88 (BP 1 Location: Left arm, BP Patient Position: At rest)   Pulse 87   Temp 98.2 °F (36.8 °C)   Resp 16   Wt 63.5 kg (140 lb)   SpO2 100%   BMI 27.34 kg/m²        Physical Exam  Constitutional:       Appearance: She is ill-appearing and toxic-appearing. She is not diaphoretic. HENT:      Head: Normocephalic and atraumatic. Mouth/Throat:      Mouth: Mucous membranes are dry. Pharynx: Oropharynx is clear. Eyes:      Extraocular Movements: Extraocular movements intact. Pupils: Pupils are equal, round, and reactive to light. Neck:      Musculoskeletal: Normal range of motion and neck supple. Cardiovascular:      Rate and Rhythm: Normal rate and regular rhythm. Pulmonary:      Effort: Pulmonary effort is normal.      Breath sounds: Normal breath sounds. Abdominal:      General: Abdomen is flat. Bowel sounds are normal.      Palpations: Abdomen is soft. Musculoskeletal:         General: No swelling or tenderness. Skin:     General: Skin is warm and dry. Neurological:      Comments: No focality, pt was up waking to bathroom x 2 during ED stay ( but not willing to participate in history w this writer             Data:   Lab Results   Component Value Date/Time    WBC 8.4 03/10/2020 10:55 AM    HGB 17.2 (H) 03/10/2020 10:55 AM    HCT 54.6 (H) 03/10/2020 10:55 AM    PLATELET 962 25/92/6628 10:55 AM    MCV 97.2 03/10/2020 10:55 AM        Lab Results   Component Value Date/Time    Sodium 135 (L) 03/10/2020 10:55 AM    Potassium 2.9 (L) 03/10/2020 10:55 AM    Chloride 102 03/10/2020 10:55 AM    CO2 28 03/10/2020 10:55 AM    Anion gap 5 03/10/2020 10:55 AM    Glucose 156 (H) 03/10/2020 10:55 AM    BUN 15 03/10/2020 10:55 AM    Creatinine 0.85 03/10/2020 10:55 AM    BUN/Creatinine ratio 18 03/10/2020 10:55 AM    GFR est AA >60 03/10/2020 10:55 AM    GFR est non-AA >60 03/10/2020 10:55 AM    Calcium 9.5 03/10/2020 10:55 AM    Bilirubin, total 0.3 03/10/2020 10:55 AM    AST (SGOT) 23 03/10/2020 10:55 AM    Alk.  phosphatase 109 03/10/2020 10:55 AM    Protein, total 8.8 (H) 03/10/2020 10:55 AM    Albumin 4.1 03/10/2020 10:55 AM    Globulin 4.7 (H) 03/10/2020 10:55 AM    A-G Ratio 0.9 (L) 03/10/2020 10:55 AM    ALT (SGPT) 29 03/10/2020 10:55 AM        EKG Results     Procedure 720 Value Units Date/Time    EKG, 12 LEAD, INITIAL [[id number]] Collected:  03/10/20 2239    Order Status:  Completed Updated:  03/10/20 2241     Ventricular Rate 117 BPM      Atrial Rate 117 BPM      P-R Interval 146 ms      QRS Duration 74 ms      Q-T Interval 366 ms      QTC Calculation (Bezet) 510 ms      Calculated P Axis 70 degrees      Calculated R Axis 52 degrees      Calculated T Axis 72 degrees      Diagnosis --     Sinus tachycardia  Biatrial enlargement  Nonspecific ST abnormality  When compared with ECG of 10-MAR-2020 10:37,  No significant change was found      EKG 12 LEAD INITIAL [[id number]] Collected:  03/10/20 1037    Order Status:  Completed Updated:  03/10/20 1040     Ventricular Rate 88 BPM      Atrial Rate 88 BPM      P-R Interval 134 ms      QRS Duration 86 ms      Q-T Interval 400 ms      QTC Calculation (Bezet) 484 ms      Calculated P Axis 60 degrees      Calculated R Axis 20 degrees      Calculated T Axis 79 degrees      Diagnosis --     Normal sinus rhythm with sinus arrhythmia  Biatrial enlargement  Prolonged QT  When compared with ECG of 16-MAY-2017 00:47,  premature ventricular complexes are no longer present      EKG, 12 LEAD, INITIAL [[id number]]     Order Status:  Canceled         XR Results (most recent):  Results from Hospital Encounter encounter on 03/10/20   XR CHEST PORT    Narrative Portable chest single view dated 3/10/2020    History is chest pain    A single portable AP semierect view of the chest was obtained. The cardiac  silhouette is grossly normal in size. There is hyperaeration of the lungs. There  is no evidence of active lung disease. Impression IMPRESSION: No evidence of active lung disease.          Personal interpretation of CXR : unremarkable          Assessment/Plan:        Assessment:     · Cp,  for symptom control  (Neg troponin serials, unremarkable ECG,  02 stats on RA 98 % ( not likely PE) )  · Cocaine abuse, recent ,   · NV -  pt claims allergy  in CC to compazine, will use Zofran;  but pt received compazine in ED w/o ill effects  · Pt is a fall risk     Plan:   · Tele  · Coreg, Catapress, Norvasc   · Avoid hydralazine as do not want to precipitate coronary steal   · If BP conts to be elevated -- will consider iv CCB on IMCU  Level w bolus and drip at an allowed taper of if no taper option a cont steady dose as in bolus 10 mg Cardizem and start 10 mg per hour--not ordering now as just given Coreg/Catapress/and Norvasc (po/cutaeously). · Hydration bolus 1000 mg and ivf's to folllow  · Goodie bag  · TSH.  Free t4  · Zofran  · Lovenox for PPX DVT    Full note to follow

## 2020-03-11 ENCOUNTER — APPOINTMENT (OUTPATIENT)
Dept: NON INVASIVE DIAGNOSTICS | Age: 55
End: 2020-03-11
Attending: HOSPITALIST
Payer: MEDICAID

## 2020-03-11 LAB
ANION GAP SERPL CALC-SCNC: 9 MMOL/L (ref 5–15)
ATRIAL RATE: 117 BPM
ATRIAL RATE: 88 BPM
AV PEAK GRADIENT: 61.48 MMHG
AV VELOCITY RATIO: 0.74
BUN SERPL-MCNC: 29 MG/DL (ref 6–20)
BUN/CREAT SERPL: 30 (ref 12–20)
CALCIUM SERPL-MCNC: 9.5 MG/DL (ref 8.5–10.1)
CALCULATED P AXIS, ECG09: 60 DEGREES
CALCULATED P AXIS, ECG09: 70 DEGREES
CALCULATED R AXIS, ECG10: 20 DEGREES
CALCULATED R AXIS, ECG10: 52 DEGREES
CALCULATED T AXIS, ECG11: 72 DEGREES
CALCULATED T AXIS, ECG11: 79 DEGREES
CHLORIDE SERPL-SCNC: 105 MMOL/L (ref 97–108)
CK SERPL-CCNC: 129 U/L (ref 26–192)
CO2 SERPL-SCNC: 22 MMOL/L (ref 21–32)
CREAT SERPL-MCNC: 0.96 MG/DL (ref 0.55–1.02)
DIAGNOSIS, 93000: NORMAL
DIAGNOSIS, 93000: NORMAL
ECHO AO ROOT DIAM: 3.25 CM
ECHO AV AREA PEAK VELOCITY: 2.4 CM2
ECHO AV PEAK GRADIENT: 25.5 MMHG
ECHO AV PEAK VELOCITY: 252.71 CM/S
ECHO AV REGURGITANT PHT: 414.6 CM
ECHO EST RA PRESSURE: 5 MMHG
ECHO LA AREA 4C: 10.4 CM2
ECHO LA MAJOR AXIS: 2.79 CM
ECHO LA TO AORTIC ROOT RATIO: 0.86
ECHO LA VOL 2C: 31.12 ML (ref 22–52)
ECHO LA VOL 4C: 20.92 ML (ref 22–52)
ECHO LA VOL BP: 27.6 ML (ref 22–52)
ECHO LV E' LATERAL VELOCITY: 8.88 CM/S
ECHO LV E' SEPTAL VELOCITY: 6.31 CM/S
ECHO LV INTERNAL DIMENSION DIASTOLIC: 3.44 CM (ref 3.9–5.3)
ECHO LV INTERNAL DIMENSION SYSTOLIC: 2.33 CM
ECHO LV IVSD: 0.79 CM (ref 0.6–0.9)
ECHO LV MASS 2D: 113.1 G (ref 67–162)
ECHO LV POSTERIOR WALL DIASTOLIC: 1.23 CM (ref 0.6–0.9)
ECHO LVOT DIAM: 2.03 CM
ECHO LVOT PEAK GRADIENT: 14.1 MMHG
ECHO LVOT PEAK VELOCITY: 187.73 CM/S
ECHO MV A VELOCITY: 45.59 CM/S
ECHO MV AREA PHT: 2.1 CM2
ECHO MV E DECELERATION TIME (DT): 361 MS
ECHO MV E VELOCITY: 64.39 CM/S
ECHO MV E/A RATIO: 1.41
ECHO MV E/E' LATERAL: 7.25
ECHO MV E/E' RATIO (AVERAGED): 8.73
ECHO MV E/E' SEPTAL: 10.2
ECHO MV PRESSURE HALF TIME (PHT): 104.7 MS
ECHO PULMONARY ARTERY SYSTOLIC PRESSURE (PASP): 35.3 MMHG
ECHO PV MAX VELOCITY: 101.91 CM/S
ECHO PV PEAK GRADIENT: 4.2 MMHG
ECHO RIGHT VENTRICULAR SYSTOLIC PRESSURE (RVSP): 35.3 MMHG
ECHO RV INTERNAL DIMENSION: 3.51 CM
ECHO RV TAPSE: 2.48 CM (ref 1.5–2)
ECHO TV REGURGITANT MAX VELOCITY: 275.36 CM/S
ECHO TV REGURGITANT PEAK GRADIENT: 30.3 MMHG
ERYTHROCYTE [DISTWIDTH] IN BLOOD BY AUTOMATED COUNT: 13.9 % (ref 11.5–14.5)
GLUCOSE SERPL-MCNC: 115 MG/DL (ref 65–100)
HCT VFR BLD AUTO: 43.9 % (ref 35–47)
HGB BLD-MCNC: 14.1 G/DL (ref 11.5–16)
LVFS 2D: 32.33 %
MAGNESIUM SERPL-MCNC: 2 MG/DL (ref 1.6–2.4)
MCH RBC QN AUTO: 30.8 PG (ref 26–34)
MCHC RBC AUTO-ENTMCNC: 32.1 G/DL (ref 30–36.5)
MCV RBC AUTO: 95.9 FL (ref 80–99)
MV DEC SLOPE: 1.78
NRBC # BLD: 0 K/UL (ref 0–0.01)
NRBC BLD-RTO: 0 PER 100 WBC
P-R INTERVAL, ECG05: 134 MS
P-R INTERVAL, ECG05: 146 MS
PISA AR MAX VEL: 392.03 CM/S
PLATELET # BLD AUTO: 302 K/UL (ref 150–400)
PMV BLD AUTO: 8.9 FL (ref 8.9–12.9)
POTASSIUM SERPL-SCNC: 4.5 MMOL/L (ref 3.5–5.1)
Q-T INTERVAL, ECG07: 366 MS
Q-T INTERVAL, ECG07: 400 MS
QRS DURATION, ECG06: 74 MS
QRS DURATION, ECG06: 86 MS
QTC CALCULATION (BEZET), ECG08: 484 MS
QTC CALCULATION (BEZET), ECG08: 510 MS
RBC # BLD AUTO: 4.58 M/UL (ref 3.8–5.2)
SODIUM SERPL-SCNC: 136 MMOL/L (ref 136–145)
VENTRICULAR RATE, ECG03: 117 BPM
VENTRICULAR RATE, ECG03: 88 BPM
WBC # BLD AUTO: 10.4 K/UL (ref 3.6–11)

## 2020-03-11 PROCEDURE — 99218 HC RM OBSERVATION: CPT

## 2020-03-11 PROCEDURE — 82550 ASSAY OF CK (CPK): CPT

## 2020-03-11 PROCEDURE — 85027 COMPLETE CBC AUTOMATED: CPT

## 2020-03-11 PROCEDURE — 93306 TTE W/DOPPLER COMPLETE: CPT

## 2020-03-11 PROCEDURE — 36415 COLL VENOUS BLD VENIPUNCTURE: CPT

## 2020-03-11 PROCEDURE — 96375 TX/PRO/DX INJ NEW DRUG ADDON: CPT

## 2020-03-11 PROCEDURE — 96376 TX/PRO/DX INJ SAME DRUG ADON: CPT

## 2020-03-11 PROCEDURE — 74011250637 HC RX REV CODE- 250/637: Performed by: INTERNAL MEDICINE

## 2020-03-11 PROCEDURE — 80048 BASIC METABOLIC PNL TOTAL CA: CPT

## 2020-03-11 PROCEDURE — 74011250636 HC RX REV CODE- 250/636: Performed by: HOSPITALIST

## 2020-03-11 PROCEDURE — 74011250636 HC RX REV CODE- 250/636: Performed by: INTERNAL MEDICINE

## 2020-03-11 PROCEDURE — 96372 THER/PROPH/DIAG INJ SC/IM: CPT

## 2020-03-11 PROCEDURE — 83735 ASSAY OF MAGNESIUM: CPT

## 2020-03-11 PROCEDURE — 74011000250 HC RX REV CODE- 250: Performed by: INTERNAL MEDICINE

## 2020-03-11 PROCEDURE — 94762 N-INVAS EAR/PLS OXIMTRY CONT: CPT

## 2020-03-11 PROCEDURE — 77010033678 HC OXYGEN DAILY

## 2020-03-11 RX ORDER — AMLODIPINE BESYLATE AND ATORVASTATIN CALCIUM 10; 10 MG/1; MG/1
1 TABLET, FILM COATED ORAL DAILY
COMMUNITY
End: 2020-03-13

## 2020-03-11 RX ORDER — LORAZEPAM 2 MG/ML
2 INJECTION INTRAMUSCULAR
Status: DISCONTINUED | OUTPATIENT
Start: 2020-03-11 | End: 2020-03-13 | Stop reason: HOSPADM

## 2020-03-11 RX ORDER — GUAIFENESIN 100 MG/5ML
81 LIQUID (ML) ORAL DAILY
COMMUNITY

## 2020-03-11 RX ADMIN — DIAZEPAM 5 MG: 5 INJECTION, SOLUTION INTRAMUSCULAR; INTRAVENOUS at 21:39

## 2020-03-11 RX ADMIN — SODIUM CHLORIDE 125 ML/HR: 900 INJECTION, SOLUTION INTRAVENOUS at 15:06

## 2020-03-11 RX ADMIN — DILTIAZEM HYDROCHLORIDE 90 MG: 60 TABLET, FILM COATED ORAL at 17:04

## 2020-03-11 RX ADMIN — DILTIAZEM HYDROCHLORIDE 90 MG: 60 TABLET, FILM COATED ORAL at 11:56

## 2020-03-11 RX ADMIN — FOLIC ACID: 5 INJECTION, SOLUTION INTRAMUSCULAR; INTRAVENOUS; SUBCUTANEOUS at 09:21

## 2020-03-11 RX ADMIN — PROMETHAZINE HYDROCHLORIDE 12.5 MG: 25 INJECTION INTRAMUSCULAR; INTRAVENOUS at 13:36

## 2020-03-11 RX ADMIN — SODIUM CHLORIDE 125 ML/HR: 900 INJECTION, SOLUTION INTRAVENOUS at 07:02

## 2020-03-11 RX ADMIN — ENOXAPARIN SODIUM 40 MG: 40 INJECTION SUBCUTANEOUS at 17:05

## 2020-03-11 RX ADMIN — CARVEDILOL 6.25 MG: 6.25 TABLET, FILM COATED ORAL at 09:19

## 2020-03-11 RX ADMIN — DIAZEPAM 5 MG: 5 INJECTION, SOLUTION INTRAMUSCULAR; INTRAVENOUS at 17:18

## 2020-03-11 RX ADMIN — ONDANSETRON 4 MG: 2 INJECTION INTRAMUSCULAR; INTRAVENOUS at 17:18

## 2020-03-11 RX ADMIN — CARVEDILOL 6.25 MG: 6.25 TABLET, FILM COATED ORAL at 17:04

## 2020-03-11 RX ADMIN — DILTIAZEM HYDROCHLORIDE 90 MG: 60 TABLET, FILM COATED ORAL at 07:27

## 2020-03-11 NOTE — PROGRESS NOTES
Reason for Admission:   Admitted with complaints of chest pain. Of note she has a history of cocaine use with positive screening. RUR Score:                  PCP: First and Last name:  No PCP   Name of Practice:    Are you a current patient: Yes/No:    Approximate date of last visit:     Do you (patient/family) have any concerns for transition/discharge? Plan for utilizing home health:   No needs identified at this time. Current Advanced Directive/Advance Care Plan:  Does not have and declines having one completed at this time. Transition of Care Plan:   Cardiac consult-echo ordered  Anticipate that she will discharge home once stable  CM specialist will schedule follow up with a PCP  Patient seen at the bedside and she would like for CM to come back as she is nauseous.     Care Management Interventions  PCP Verified by CM: Yes(No PCP)  Palliative Care Criteria Met (RRAT>21 & CHF Dx)?: No  Mode of Transport at Discharge: (private car)  Current Support Network: Lives Alone  Confirm Follow Up Transport: Self  The Plan for Transition of Care is Related to the Following Treatment Goals : (resolution of chest pain)  The [employer] Information Provided?: No    Raghavendra Woods RN CRM  Ext [phone]

## 2020-03-11 NOTE — PROGRESS NOTES
Problem: Falls - Risk of  Goal: *Absence of Falls  Description: Document Gilson Ramirez Fall Risk and appropriate interventions in the flowsheet. Outcome: Progressing Towards Goal  Note: Fall Risk Interventions:            Medication Interventions: Evaluate medications/consider consulting pharmacy, Teach patient to arise slowly                   Problem: Pain  Goal: *Control of Pain  Outcome: Progressing Towards Goal  Note: Patient complains of stomach pain and discomfort. Patient received medication to control discomfort.

## 2020-03-11 NOTE — CARDIO/PULMONARY
Cardiac Rehab: Per EMR, patient is a current smoker.  Smoking Cessation Program information placed on the AVS.   
Tennille Aden RN

## 2020-03-11 NOTE — PROGRESS NOTES
Bedside and Verbal shift change report given to Js Bower RN (oncoming nurse) by Matt Angulo RN and Justyn Nicolas RN (offgoing nurse). Report included the following information SBAR, Kardex, Intake/Output, MAR, Recent Results, Cardiac Rhythm NSR and Quality Measures.

## 2020-03-11 NOTE — PROGRESS NOTES
[address] [facility] Adult  Hospitalist Group                                                                                          Hospitalist Progress Note  Michel Ledesma MD  Answering service: [phone] from in house phone        Date of Service:  3/11/2020  NAME:  Daria Posada  :  1965  MRN:  [id number]      Admission Summary:     A 53 y.o. female with past medical history significant for HTN, and MI who presents via EMS with chief complaint of chest pain. Pt complains of sudden onset L chest pain that feels like pressure, and radiates to both of her underarms, that began this morning at 06:00. Her pain is worse with palpation, and she states this pain feels similar to her previous MI 6 years ago. Pt does not follow with a cardiologist at this time, but states she was seen for her MI at [facility] [place]. She also endorses nausea, vomiting, shortness of breath, lightheadedness, dizziness, and urinary frequency. She denies leg pain, and leg swelling. She has a history of cocaine abuse, and states she last used it 2 days ago, and she does not use it regularly. Pt was hypertensive and diaphoretic for EMS, with pressure in the 549'X systolic, but was improved slightly with nitropaste. She did not receive aspirin form EMS. Pt is on amlodipine for her hypertension, and last took it yesterday. There are no other acute medical concerns at this time. Endorses tobacco smoking; denies EtOH use; endorses cocaine use.     Interval history / Subjective:     Complain left side chest pain 3/5, abdominal pain and vomiting once      Assessment & Plan:     Chest pain likely due to cocaine abuse, hx of MI 6 years ago  -last cocaine use was 3 days ago  -chest pain likely due to cocaine induced coronary spasm   -continue on diltiazem  -troponin <0.05 x 2  -EKG sinus tachycardia vent rate 117 non specific st t wave non specific st t wave  -d dimer 0.64 normal  -toxicology screening positive for cocaine   -chest x ray no evidence of acute lung disease   - Echo normal cavity size, systolic function (ejection fraction normal) and diastolic function. Mildly increased wall thickness. Estimated left ventricular ejection fraction is 65 - 70%. No regional wall motion abnormality noted. HTN  -BP improving, on coreg, diltiazem and clonidine, monitor BP     Tobacco and cocaine use   -advised to stop alcohol, cocaine and tobacco use     Alcohol abuse  -continue iv folic acid, thiamin and mvi  -lorazepam per CIWA protocol      Code status: Full Code   DVT prophylaxis: lovenox     Care Plan discussed with: Patient/Family and Nurse  Anticipated Disposition: Home w/Family  Anticipated Discharge: 24 hours to 48 hours     Hospital Problems  Never Reviewed          Codes Class Noted POA    Chest pain ICD-10-CM: R07.9  ICD-9-CM: 786.50  3/10/2020 Unknown              Vital Signs:    Last 24hrs VS reviewed since prior progress note. Most recent are:  Visit Vitals  BP (!) 149/95 (BP 1 Location: Right arm, BP Patient Position: At rest)   Pulse 93   Temp 98.4 °F (36.9 °C)   Resp 15   Wt 63.5 kg (140 lb)   SpO2 100%   BMI 27.34 kg/m²       No intake or output data in the 24 hours ending 03/11/20 1936     Physical Examination:             Constitutional:  No acute distress, cooperative, pleasant    ENT:  Oral mucosa moist, oropharynx benign. Resp:  CTA bilaterally. No wheezing/rhonchi/rales. No accessory muscle use   CV:  Regular rhythm, normal rate, no murmurs, gallops, rubs    GI:  Soft, non distended, non tender. normoactive bowel sounds, no hepatosplenomegaly     Musculoskeletal:  No edema     Neurologic:  Moves all extremities.   AAOx3, CN II-XII reviewed     Skin:  Good turgor, no rashes or ulcers       Data Review:    Review and/or order of clinical lab test  Review and/or order of tests in the radiology section of CPT  Review and/or order of tests in the medicine section of CPT      Labs:     Recent Labs     03/11/20  1317 03/10/20  1055 WBC 10.4 8.4   HGB 14.1 17.2*   HCT 43.9 54.6*    334     Recent Labs     03/11/20  0730 03/11/20  0713 03/10/20  1055   NA  --  136 135*   K  --  4.5 2.9*   CL  --  105 102   CO2  --  22 28   BUN  --  29* 15   CREA  --  0.96 0.85   GLU  --  115* 156*   CA  --  9.5 9.5   MG 2.0  --   --      Recent Labs     03/10/20  1055   SGOT 23   ALT 29      TBILI 0.3   TP 8.8*   ALB 4.1   GLOB 4.7*     No results for input(s): INR, PTP, APTT, INREXT, INREXT in the last 72 hours. No results for input(s): FE, TIBC, PSAT, FERR in the last 72 hours. No results found for: FOL, RBCF   No results for input(s): PH, PCO2, PO2 in the last 72 hours. Recent Labs     03/11/20  0713 03/10/20  1505 03/10/20  1055     --   --    TROIQ  --  <0.05 <0.05     No results found for: CHOL, CHOLX, CHLST, CHOLV, HDL, HDLP, LDL, LDLC, DLDLP, TGLX, TRIGL, TRIGP, CHHD, CHHDX  No results found for: GLUCPOC  No results found for: COLOR, APPRN, SPGRU, REFSG, SERAFIN, PROTU, GLUCU, KETU, BILU, UROU, ALAINA, LEUKU, GLUKE, EPSU, BACTU, WBCU, RBCU, CASTS, UCRY      Medications Reviewed:     Current Facility-Administered Medications   Medication Dose Route Frequency    dilTIAZem (CARDIZEM) IR tablet 90 mg  90 mg Oral TIDAC    promethazine (PHENERGAN) 12.5 mg in NS 50 mL IVPB  12.5 mg IntraVENous Q6H PRN    ondansetron (ZOFRAN) injection 4 mg  4 mg IntraVENous Q4H PRN    carvediloL (COREG) tablet 6.25 mg  6.25 mg Oral BID WITH MEALS    diazePAM (VALIUM) injection 5 mg  5 mg IntraVENous Q4H PRN    0.9% sodium chloride 1,000 mL with mvi, adult no. 4 with vit K 10 mL, thiamine 057 mg, folic acid 1 mg infusion   IntraVENous DAILY    cloNIDine (CATAPRES) 0.2 mg/24 hr patch 1 Patch  1 Patch TransDERmal Q7D    enoxaparin (LOVENOX) injection 40 mg  40 mg SubCUTAneous Q24H    0.9% sodium chloride infusion  125 mL/hr IntraVENous CONTINUOUS     ______________________________________________________________________  EXPECTED LENGTH OF STAY: - - -  ACTUAL LENGTH OF STAY:          Miguelangel Carl MD

## 2020-03-11 NOTE — PROGRESS NOTES
TRANSFER - OUT REPORT:    At 80 Verbal report given to Alla REESE(name) on [place]  being transferred to [facility]) for change in patient condition(worsing )       Report consisted of patients Situation, Background, Assessment and   Recommendations(SBAR). Information from the following report(s) SBAR, Kardex, ED Summary, Recent Results and Cardiac Rhythm Sinus Tach was reviewed with the receiving nurse. Lines:   Peripheral IV 03/10/20 Left External jugular (Active)   Site Assessment Clean, dry, & intact 3/10/2020 11:46 PM   Phlebitis Assessment 0 3/10/2020 11:46 PM   Infiltration Assessment 0 3/10/2020 11:46 PM   Dressing Status Clean;Dry 3/10/2020 11:46 PM   Dressing Type Transparent 3/10/2020 11:46 PM   Hub Color/Line Status Green; Infusing 3/10/2020 11:46 PM   Action Taken Open ports on tubing capped 3/10/2020 11:46 PM   Alcohol Cap Used Yes 3/10/2020 11:46 PM        Opportunity for questions and clarification was provided.       Patient transported with:   Monitor  O2 @ 2 liters  Registered Nurse

## 2020-03-12 LAB
ALBUMIN SERPL-MCNC: 2.9 G/DL (ref 3.5–5)
ALBUMIN/GLOB SERPL: 0.8 {RATIO} (ref 1.1–2.2)
ALP SERPL-CCNC: 72 U/L (ref 45–117)
ALT SERPL-CCNC: 25 U/L (ref 12–78)
ANION GAP SERPL CALC-SCNC: 7 MMOL/L (ref 5–15)
AST SERPL-CCNC: 16 U/L (ref 15–37)
BILIRUB SERPL-MCNC: 0.3 MG/DL (ref 0.2–1)
BUN SERPL-MCNC: 26 MG/DL (ref 6–20)
BUN/CREAT SERPL: 31 (ref 12–20)
CALCIUM SERPL-MCNC: 8.3 MG/DL (ref 8.5–10.1)
CHLORIDE SERPL-SCNC: 110 MMOL/L (ref 97–108)
CO2 SERPL-SCNC: 23 MMOL/L (ref 21–32)
CREAT SERPL-MCNC: 0.84 MG/DL (ref 0.55–1.02)
ERYTHROCYTE [DISTWIDTH] IN BLOOD BY AUTOMATED COUNT: 13.7 % (ref 11.5–14.5)
GLOBULIN SER CALC-MCNC: 3.5 G/DL (ref 2–4)
GLUCOSE SERPL-MCNC: 95 MG/DL (ref 65–100)
HCT VFR BLD AUTO: 44 % (ref 35–47)
HGB BLD-MCNC: 13.8 G/DL (ref 11.5–16)
MAGNESIUM SERPL-MCNC: 2.2 MG/DL (ref 1.6–2.4)
MCH RBC QN AUTO: 31.1 PG (ref 26–34)
MCHC RBC AUTO-ENTMCNC: 31.4 G/DL (ref 30–36.5)
MCV RBC AUTO: 99.1 FL (ref 80–99)
NRBC # BLD: 0 K/UL (ref 0–0.01)
NRBC BLD-RTO: 0 PER 100 WBC
PHOSPHATE SERPL-MCNC: 3.6 MG/DL (ref 2.6–4.7)
PLATELET # BLD AUTO: 275 K/UL (ref 150–400)
PMV BLD AUTO: 8.9 FL (ref 8.9–12.9)
POTASSIUM SERPL-SCNC: 3.6 MMOL/L (ref 3.5–5.1)
PROT SERPL-MCNC: 6.4 G/DL (ref 6.4–8.2)
RBC # BLD AUTO: 4.44 M/UL (ref 3.8–5.2)
SODIUM SERPL-SCNC: 140 MMOL/L (ref 136–145)
WBC # BLD AUTO: 8.3 K/UL (ref 3.6–11)

## 2020-03-12 PROCEDURE — 96361 HYDRATE IV INFUSION ADD-ON: CPT

## 2020-03-12 PROCEDURE — 74011000250 HC RX REV CODE- 250: Performed by: INTERNAL MEDICINE

## 2020-03-12 PROCEDURE — 84100 ASSAY OF PHOSPHORUS: CPT

## 2020-03-12 PROCEDURE — 83735 ASSAY OF MAGNESIUM: CPT

## 2020-03-12 PROCEDURE — 74011250637 HC RX REV CODE- 250/637: Performed by: INTERNAL MEDICINE

## 2020-03-12 PROCEDURE — 96376 TX/PRO/DX INJ SAME DRUG ADON: CPT

## 2020-03-12 PROCEDURE — 74011250637 HC RX REV CODE- 250/637: Performed by: PHYSICIAN ASSISTANT

## 2020-03-12 PROCEDURE — 74011250636 HC RX REV CODE- 250/636: Performed by: HOSPITALIST

## 2020-03-12 PROCEDURE — 99218 HC RM OBSERVATION: CPT

## 2020-03-12 PROCEDURE — 74011250636 HC RX REV CODE- 250/636: Performed by: INTERNAL MEDICINE

## 2020-03-12 PROCEDURE — 36415 COLL VENOUS BLD VENIPUNCTURE: CPT

## 2020-03-12 PROCEDURE — 80053 COMPREHEN METABOLIC PANEL: CPT

## 2020-03-12 PROCEDURE — 85027 COMPLETE CBC AUTOMATED: CPT

## 2020-03-12 PROCEDURE — 93005 ELECTROCARDIOGRAM TRACING: CPT

## 2020-03-12 PROCEDURE — 94621 CARDIOPULM EXERCISE TESTING: CPT

## 2020-03-12 PROCEDURE — 74011000250 HC RX REV CODE- 250: Performed by: SPECIALIST

## 2020-03-12 PROCEDURE — 96375 TX/PRO/DX INJ NEW DRUG ADDON: CPT

## 2020-03-12 RX ORDER — POTASSIUM CHLORIDE 750 MG/1
40 TABLET, FILM COATED, EXTENDED RELEASE ORAL
Status: COMPLETED | OUTPATIENT
Start: 2020-03-12 | End: 2020-03-12

## 2020-03-12 RX ORDER — CARVEDILOL 6.25 MG/1
6.25 TABLET ORAL 2 TIMES DAILY WITH MEALS
Qty: 60 TAB | Refills: 0 | Status: SHIPPED | OUTPATIENT
Start: 2020-03-12 | End: 2020-03-13

## 2020-03-12 RX ORDER — NITROGLYCERIN 40 MG/1
1 PATCH TRANSDERMAL DAILY
Status: DISCONTINUED | OUTPATIENT
Start: 2020-03-12 | End: 2020-03-12

## 2020-03-12 RX ORDER — ATORVASTATIN CALCIUM 40 MG/1
40 TABLET, FILM COATED ORAL DAILY
Qty: 30 TAB | Refills: 0 | Status: SHIPPED | OUTPATIENT
Start: 2020-03-12

## 2020-03-12 RX ORDER — MORPHINE SULFATE 2 MG/ML
1 INJECTION, SOLUTION INTRAMUSCULAR; INTRAVENOUS
Status: DISCONTINUED | OUTPATIENT
Start: 2020-03-12 | End: 2020-03-13 | Stop reason: HOSPADM

## 2020-03-12 RX ORDER — HYDRALAZINE HYDROCHLORIDE 20 MG/ML
20 INJECTION INTRAMUSCULAR; INTRAVENOUS
Status: DISCONTINUED | OUTPATIENT
Start: 2020-03-12 | End: 2020-03-13 | Stop reason: HOSPADM

## 2020-03-12 RX ORDER — NITROGLYCERIN 40 MG/1
1 PATCH TRANSDERMAL DAILY
Status: DISCONTINUED | OUTPATIENT
Start: 2020-03-13 | End: 2020-03-12

## 2020-03-12 RX ORDER — HYDRALAZINE HYDROCHLORIDE 25 MG/1
25 TABLET, FILM COATED ORAL 3 TIMES DAILY
Status: DISCONTINUED | OUTPATIENT
Start: 2020-03-12 | End: 2020-03-13

## 2020-03-12 RX ORDER — DILTIAZEM HYDROCHLORIDE 90 MG/1
90 TABLET, FILM COATED ORAL
Qty: 90 TAB | Refills: 0 | Status: SHIPPED | OUTPATIENT
Start: 2020-03-12 | End: 2020-03-12

## 2020-03-12 RX ORDER — METOPROLOL TARTRATE 5 MG/5ML
5 INJECTION INTRAVENOUS ONCE
Status: COMPLETED | OUTPATIENT
Start: 2020-03-12 | End: 2020-03-12

## 2020-03-12 RX ORDER — LOSARTAN POTASSIUM 25 MG/1
25 TABLET ORAL DAILY
Status: DISCONTINUED | OUTPATIENT
Start: 2020-03-13 | End: 2020-03-13

## 2020-03-12 RX ADMIN — SODIUM CHLORIDE 125 ML/HR: 900 INJECTION, SOLUTION INTRAVENOUS at 10:16

## 2020-03-12 RX ADMIN — LORAZEPAM 2 MG: 2 INJECTION INTRAMUSCULAR; INTRAVENOUS at 16:50

## 2020-03-12 RX ADMIN — ONDANSETRON 4 MG: 2 INJECTION INTRAMUSCULAR; INTRAVENOUS at 14:16

## 2020-03-12 RX ADMIN — FOLIC ACID: 5 INJECTION, SOLUTION INTRAMUSCULAR; INTRAVENOUS; SUBCUTANEOUS at 08:30

## 2020-03-12 RX ADMIN — DILTIAZEM HYDROCHLORIDE 90 MG: 60 TABLET, FILM COATED ORAL at 06:30

## 2020-03-12 RX ADMIN — METOPROLOL TARTRATE 5 MG: 5 INJECTION INTRAVENOUS at 16:54

## 2020-03-12 RX ADMIN — PROMETHAZINE HYDROCHLORIDE 12.5 MG: 25 INJECTION INTRAMUSCULAR; INTRAVENOUS at 14:59

## 2020-03-12 RX ADMIN — SODIUM CHLORIDE 125 ML/HR: 900 INJECTION, SOLUTION INTRAVENOUS at 01:04

## 2020-03-12 RX ADMIN — ENOXAPARIN SODIUM 40 MG: 40 INJECTION SUBCUTANEOUS at 18:39

## 2020-03-12 RX ADMIN — DIAZEPAM 5 MG: 5 INJECTION, SOLUTION INTRAMUSCULAR; INTRAVENOUS at 15:14

## 2020-03-12 RX ADMIN — CARVEDILOL 6.25 MG: 6.25 TABLET, FILM COATED ORAL at 16:24

## 2020-03-12 RX ADMIN — DILTIAZEM HYDROCHLORIDE 90 MG: 60 TABLET, FILM COATED ORAL at 16:23

## 2020-03-12 RX ADMIN — POTASSIUM CHLORIDE 40 MEQ: 750 TABLET, FILM COATED, EXTENDED RELEASE ORAL at 08:29

## 2020-03-12 NOTE — PROGRESS NOTES
Physical Therapy Note:  Consult received and  chart reviewed. Patient cleared by nursing. Upon arrival the patient politely but adamantly deferred reporting independent baseline mobility without difficulty. Per nursing, the patient is up ad kei in her room. Will defer evaluation and sign off.   Cipriano Smith, PT, DPT

## 2020-03-12 NOTE — PHYSICIAN ADVISORY
Letter of Status Determination:  
Recommend hospitalization status upgraded from OBSERVATION  to INPATIENT  Status Pt Name:  Opal Canavan MR#  
KESHIA # [id number] / 
[id number] Payor: SELF PAY / Plan: [facility] SELF PAY / Product Type: Self Pay /   
CSN#  [id number] [facility]  405[facility]01  @ [facility]  
Hospitalization date  3/10/2020 10:22 AM  
Current Attending Physician  Julian Cee MD  
Principal diagnosis  Chest pain Clinicals  53 y.o. female with past medical history significant for HTN, and MI who presents via EMS with chief complaint of chest pain. Pt complains of sudden onset L chest pain that feels like pressure, and radiates to both of her underarms, that began this morning at 06:00. Her pain is worse with palpation, and she states this pain feels similar to her previous MI 6 years ago. Pt does not follow with a cardiologist at this time, but states she was seen for her MI at [facility] [place]. She also endorses nausea, vomiting, shortness of breath, lightheadedness, dizziness, and urinary frequency. She denies leg pain, and leg swelling. She has a history of cocaine abuse, and states she last used it 2 days ago, and she does not use it regularly. Pt was hypertensive and diaphoretic for EMS, with pressure in the 373'T systolic, but was improved slightly with nitropaste. She did not receive aspirin form EMS. Pt is on amlodipine for her hypertension, and last took it yesterday. There are no other acute medical concerns at this time. Continued Chest pain, recent cocaine use, sinus tachycardia, BP labile [place] (MCG) criteria Does  NOT apply STATUS DETERMINATION  Inpatient The final decision of the patient's hospitalization status depends on the attending physician's judgment Additional comments Payor: SELF PAY / Plan: [facility] SELF PAY / Product Type: Self Pay /   
  
 
Laverne Serna MD 
Cell: [phone] Physician Advisor

## 2020-03-12 NOTE — PROGRESS NOTES
Hospital follow-up new PCP transitional care appointment has been scheduled with Dr. Veleria Opitz for Thursday, 3/19/20 at 9:00 a.m. Pending patient discharge.   Iveth Joseph, Care Management Specialist.

## 2020-03-12 NOTE — PROGRESS NOTES
03/12/20 1150 03/12/20 1152 03/12/20 1154   RT Walking Oximetry   Stage Resting (Room Air) During Walk (Room Air) During Walk (Room Air)   SpO2 98 % 95 % 96 %   HR 70 bpm 78 bpm 80 bpm   Rate of Dyspnea 0 0 0   Symptoms  --    (no symptoms)  --    O2 Device  --   --  None (Room air)   FIO2 (%)  --   --   --    Walk/Assistive Device  --   --   --    Distance Walked in Feet (ft)  --   --   --    Comments  --   --   --       03/12/20 1156   RT Walking Oximetry   Stage After Walk   SpO2 98 %   HR 86 bpm   Rate of Dyspnea 0   Symptoms  --    O2 Device None (Room air)   FIO2 (%) 21 %   Walk/Assistive Device   (walked without device)   Distance Walked in Feet (ft) 377 ft   Comments   (pt passes six minute walk test. )

## 2020-03-12 NOTE — PROGRESS NOTES
[facility] Adult  Hospitalist Group                                                                                          Hospitalist Progress Note  Niyah Huber MD  Answering service: [phone] OR [phone] from in house phone        Date of Service:  3/12/2020  NAME:  Paula Salinas  :  1965  MRN:  [id number]      Admission Summary:     A 53 y.o. female with past medical history significant for HTN, and MI who presents via EMS with chief complaint of chest pain. Pt complains of sudden onset L chest pain that feels like pressure, and radiates to both of her underarms, that began this morning at 06:00. Her pain is worse with palpation, and she states this pain feels similar to her previous MI 6 years ago. Pt does not follow with a cardiologist at this time, but states she [place] [facility] [place] She also endorses nausea, vomiting, shortness of breath, lightheadedness, dizziness, and urinary frequency. She denies leg pain, and leg swelling. She has a history of cocaine abuse, and states she last used it 2 days ago, and she does not use it regularly. Pt was hypertensive and diaphoretic for EMS, with pressure in the 463'C systolic, but was improved slightly with nitropaste. She did not receive aspirin form EMS. Pt is on amlodipine for her hypertension, and last took it yesterday. There are no other acute medical concerns at this time. Endorses tobacco smoking; denies EtOH use; endorses cocaine use. Interval history / Subjective: Follow up Chest pain likely cocaine induced  Patient seen and examined by the bedside, Labs, images and notes reviewed  Denies any chest pain , sob, palpitations  Discussed with her about discharge planning today, PT to walk her to check for O2 need  Discussed with nursing staff, orders reviewed.    Plan discussed with patient    No oxygen needed decided after walk test  Did discharge pt  Called by nurse later about pt complaining of chest pain, EKG done immediately did not show any changes   From previous one, will do Morphine for pain, BP very high, but all BP before was low and therefore diltiazem and coreg was held. Assessment & Plan:     Chest pain likely due to cocaine abuse, hx of MI 6 years ago  -last cocaine use was 3 days ago  -chest pain likely due to cocaine induced coronary spasm   -continue on diltiazem  -troponin <0.05 x 2  -EKG sinus tachycardia vent rate 117 non specific st t wave non specific st t wave  -d dimer 0.64 normal  -toxicology screening positive for cocaine   -chest x ray no evidence of acute lung disease   - Echo normal cavity size, systolic function (ejection fraction normal) and diastolic function. Mildly increased wall thickness. Estimated left ventricular ejection fraction is 65 - 70%. No regional wall motion abnormality noted. Called by nurse later about pt complaining of chest pain, EKG done immediately did not show any changes   From previous one, will do Morphine for pain, BP very high, but all BP before was low and therefore diltiazem and coreg was held. Consult cardiology, appreciate input, changes made in BP medications and Stress echo to be done tomorrow      HTN  -BP improving, on coreg, diltiazem and clonidine, monitor BP     Tobacco and cocaine use   -advised to stop alcohol, cocaine and tobacco use     Alcohol abuse  -continue iv folic acid, thiamin and mvi  -lorazepam per [facility] protocol      Code status: Full Code   DVT prophylaxis: lovenox     Care Plan discussed with: Patient/Family and Nurse  Anticipated Disposition: Home w/Family  Anticipated Discharge: 24 hours to 48 hours     Hospital Problems  Never Reviewed          Codes Class Noted POA    Chest pain ICD-10-CM: R07.9  ICD-9-CM: 786.50  3/10/2020 Unknown              Vital Signs:    Last 24hrs VS reviewed since prior progress note.  Most recent are:  Visit Vitals  BP (!) 213/117   Pulse (!) 104   Temp 98.5 °F (36.9 °C)   Resp 16   Ht 5' 1\" (1.549 m)   Wt 55.4 kg (122 lb 2.2 oz)   SpO2 100%   BMI 23.08 kg/m²       No intake or output data in the 24 hours ending 03/12/20 1811     Physical Examination:             Constitutional:  No acute distress, cooperative, pleasant    ENT:  Oral mucosa moist, oropharynx benign. Resp:  CTA bilaterally. No wheezing/rhonchi/rales. No accessory muscle use   CV:  Regular rhythm, normal rate, no murmurs, gallops, rubs    GI:  Soft, non distended, non tender. normoactive bowel sounds, no hepatosplenomegaly     Musculoskeletal:  No edema     Neurologic:  Moves all extremities. AAOx3, CN II-XII reviewed     Skin:  Good turgor, no rashes or ulcers       Data Review:    Review and/or order of clinical lab test  Review and/or order of tests in the radiology section of CPT  Review and/or order of tests in the medicine section of CPT      Labs:     Recent Labs     03/12/20 0425 03/11/20  1317   WBC 8.3 10.4   HGB 13.8 14.1   HCT 44.0 43.9    302     Recent Labs     03/12/20 0425 03/11/20  0730 03/11/20  0713 03/10/20  1055     --  136 135*   K 3.6  --  4.5 2.9*   *  --  105 102   CO2 23  --  22 28   BUN 26*  --  29* 15   CREA 0.84  --  0.96 0.85   GLU 95  --  115* 156*   CA 8.3*  --  9.5 9.5   MG 2.2 2.0  --   --    PHOS 3.6  --   --   --      Recent Labs     03/12/20  0425 03/10/20  1055   SGOT 16 23   ALT 25 29   AP 72 109   TBILI 0.3 0.3   TP 6.4 8.8*   ALB 2.9* 4.1   GLOB 3.5 4.7*     No results for input(s): INR, PTP, APTT, INREXT, INREXT in the last 72 hours. No results for input(s): FE, TIBC, PSAT, FERR in the last 72 hours. No results found for: FOL, RBCF   No results for input(s): PH, PCO2, PO2 in the last 72 hours.   Recent Labs     03/11/20 0713 03/10/20  1505 03/10/20  1055     --   --    TROIQ  --  <0.05 <0.05     No results found for: CHOL, CHOLX, CHLST, CHOLV, HDL, HDLP, LDL, LDLC, DLDLP, TGLX, TRIGL, TRIGP, CHHD, CHHDX  No results found for: GLUCPOC  No results found for: COLOR, APPRN, SPGRU, REFSG, SERAFIN, PROTU, GLUCU, KETU, BILU, UROU, ALAINA, LEUKU, GLUKE, EPSU, BACTU, WBCU, RBCU, CASTS, UCRY      Medications Reviewed:     Current Facility-Administered Medications   Medication Dose Route Frequency    morphine injection 1 mg  1 mg IntraVENous Q4H PRN    hydrALAZINE (APRESOLINE) tablet 25 mg  25 mg Oral TID    [START ON 3/13/2020] losartan (COZAAR) tablet 25 mg  25 mg Oral DAILY    dilTIAZem (CARDIZEM) IR tablet 90 mg  90 mg Oral TIDAC    promethazine (PHENERGAN) 12.5 mg in NS 50 mL IVPB  12.5 mg IntraVENous Q6H PRN    LORazepam (ATIVAN) injection 2 mg  2 mg IntraVENous Q1H PRN    ondansetron (ZOFRAN) injection 4 mg  4 mg IntraVENous Q4H PRN    carvediloL (COREG) tablet 6.25 mg  6.25 mg Oral BID WITH MEALS    diazePAM (VALIUM) injection 5 mg  5 mg IntraVENous Q4H PRN    0.9% sodium chloride 1,000 mL with mvi, adult no. 4 with vit K 10 mL, thiamine 014 mg, folic acid 1 mg infusion   IntraVENous DAILY    cloNIDine (CATAPRES) 0.2 mg/24 hr patch 1 Patch  1 Patch TransDERmal Q7D    enoxaparin (LOVENOX) injection 40 mg  40 mg SubCUTAneous Q24H     ______________________________________________________________________  EXPECTED LENGTH OF STAY: - - -  ACTUAL LENGTH OF STAY:          1                 Judge Benoit MD

## 2020-03-12 NOTE — DISCHARGE SUMMARY
Discharge Summary       PATIENT ID: Dariana Braden  MRN: [id number]   YOB: 1965    DATE OF ADMISSION: 3/10/2020 10:22 AM    DATE OF DISCHARGE: 3/12/20  PRIMARY CARE PROVIDER: Gena Hamilton MD     ATTENDING PHYSICIAN: Marii Vega MD    DISCHARGING PROVIDER: Marii Vega MD    To contact this individual call [phone] and ask the  to page. If unavailable ask to be transferred the Adult Hospitalist Department. CONSULTATIONS: None    PROCEDURES/SURGERIES: * No surgery found *    ADMITTING DIAGNOSES & HOSPITAL COURSE:      53 y.o. female with past medical history significant for HTN, and MI who presents via EMS with chief complaint of chest pain. Pt complains of sudden onset L chest pain that feels like pressure, and radiates to both of her underarms, that began this morning at 06:00. Her pain is worse with palpation, and she states this pain feels similar to her previous MI 6 years ago. Pt does not follow with a cardiologist at this time, but states she was [place] [facility] [place] She also endorses nausea, vomiting, shortness of breath, lightheadedness, dizziness, and urinary frequency. She denies leg pain, and leg swelling. She has a history of cocaine abuse, and states she last used it 2 days ago, and she does not use it regularly. Pt was hypertensive and diaphoretic for EMS, with pressure in the 591'L systolic, but was improved slightly with nitropaste. She did not receive aspirin form EMS. Pt is on amlodipine for her hypertension, and last took it yesterday. There are no other acute medical concerns at this time.  Endorses tobacco smoking; denies EtOH use; endorses cocaine use.     Chest pain likely due to cocaine abuse, hx of MI 6 years ago  -last cocaine use was 3 days ago  -chest pain likely due to cocaine induced coronary spasm   -continue on diltiazem in hospital, can not afford diltiazem, will only continue Coreg  -troponin <0.05 x 2  -EKG sinus tachycardia vent rate 117 non specific st t wave non specific st t wave  -d dimer 0.64 normal  -toxicology screening positive for cocaine   -chest x ray no evidence of acute lung disease   - Echo normal cavity size, systolic function (ejection fraction normal) and diastolic function. Mildly increased wall thickness. Estimated left ventricular ejection fraction is 65 - 70%. No regional wall motion abnormality noted.     HTN  -BP improving, on coreg, diltiazem and clonidine, monitor BP      Tobacco and cocaine use   -advised to stop alcohol, cocaine and tobacco use      Alcohol abuse  -continue iv folic acid, thiamin and mvi  -lorazepam per [facility] protocol          DISCHARGE DIAGNOSES / PLAN:      Discharged on Coreg and Statin          PENDING TEST RESULTS:   At the time of discharge the following test results are still pending: none    FOLLOW UP APPOINTMENTS:    Follow-up Information     Follow up With Specialties Details Why Contact Info    Other, MD Rayna    Patient can only remember the practice name and not the physician               ADDITIONAL CARE RECOMMENDATIONS:   · It is important that you take the medication exactly as they are prescribed. · Keep your medication in the bottles provided by the pharmacist and keep a list of the medication names, dosages, and times to be taken in your wallet. · Do not take other medications without consulting your doctor. · No drinking alcohol or driving car or operating machinery if you are on narcotic pain medications. Donot take sedating mediations if you are sleepy or confused. · Fall Precautions  · Keep Well Hydrated  · Report to your medical provider if you feel you have  developed allergies to medications  · Follow up with your PCP or Consultant for medication adjustments and refills  · Monitor for signs of fevers,chills,bleeding,chest pain and seek medical attention if you do so.    ·          DIET: Cardiac Diet      ACTIVITY: Activity as tolerated    WOUND CARE: none    EQUIPMENT needed: none          DISCHARGE MEDICATIONS:  Current Discharge Medication List      START taking these medications    Details   atorvastatin (LIPITOR) 40 mg tablet Take 1 Tab by mouth daily. Qty: 30 Tab, Refills: 0      carvediloL (COREG) 6.25 mg tablet Take 1 Tab by mouth two (2) times daily (with meals). Qty: 60 Tab, Refills: 0         CONTINUE these medications which have NOT CHANGED    Details   aspirin 81 mg chewable tablet Take 81 mg by mouth daily. promethazine (PHENERGAN) 25 mg tablet Take 1 Tab by mouth every six (6) hours as needed. Qty: 12 Tab, Refills: 0         STOP taking these medications       amLODIPine-atorvastatin (CADUET) 10-10 mg per tablet Comments:   Reason for Stopping:                 NOTIFY YOUR PHYSICIAN FOR ANY OF THE FOLLOWING:   Fever over 101 degrees for 24 hours. Chest pain, shortness of breath, fever, chills, nausea, vomiting, diarrhea, change in mentation, falling, weakness, bleeding. Severe pain or pain not relieved by medications. Or, any other signs or symptoms that you may have questions about. DISPOSITION:   x Home With:   OT  PT  HH  RN       Long term SNF/Inpatient Rehab    Independent/assisted living    Hospice    Other:       PATIENT CONDITION AT DISCHARGE:     Functional status    Poor     Deconditioned    x Independent      Cognition     Lucid     Forgetful     Dementia      Catheters/lines (plus indication)    Barros     PICC     PEG     None      Code status    x Full code     DNR      PHYSICAL EXAMINATION AT DISCHARGE:  General:          Alert, cooperative, no distress, appears stated age. HEENT:           Atraumatic, anicteric sclerae                           Lungs:             Clear to auscultation bilaterally. No Wheezing or Rhonchi. No rales  Chest wall:      No tenderness  No Accessory muscle use. Heart:              Regular  rhythm,  No  murmur   No edema  Abdomen:        Soft, non-tender. Not distended.   Bowel sounds normal  Extremities: No cyanosis. No clubbing,                            Skin turgor normal, Capillary refill normal    Psych:             Not anxious or agitated.   Neurologic:      Alert, moves all extremities, answers questions appropriately and responds to commands       CHRONIC MEDICAL DIAGNOSES:  Problem List as of 3/12/2020 Never Reviewed          Codes Class Noted - Resolved    Chest pain ICD-10-CM: R07.9  ICD-9-CM: 786.50  3/10/2020 - Present              Greater than 45 minutes were spent with the patient on counseling and coordination of care    Signed:   Niyah Huber MD  3/12/2020  1:57 PM

## 2020-03-12 NOTE — PROGRESS NOTES
Problem: Falls - Risk of  Goal: *Absence of Falls  Description: Document Yuan Ulloa Fall Risk and appropriate interventions in the flowsheet. Outcome: Progressing Towards Goal  Note: Fall Risk Interventions:            Medication Interventions: Patient to call before getting OOB, Teach patient to arise slowly, Evaluate medications/consider consulting pharmacy                   Problem: Hypertension  Goal: *Blood pressure within specified parameters  Outcome: Progressing Towards Goal  Note: BP elevated towards end of shift. Coreg and Cardizem held this AM d/t SBP 90 - 110 but administered in afternoon to better control BP. Cardiology consulted for management. Will continue to monitor.

## 2020-03-12 NOTE — DISCHARGE INSTRUCTIONS
Smoking Cessation Program: This is a free, phone/text/email based, smoking cessation program. The program is individualized to meet each patient's needs. To enroll use the link - [facility]. [email or web address]/quit or text Mike Aggarwal to [phone] from any smart phone. Discharge Instructions       PATIENT ID: Jean-Claude Nguyen  MRN: [id number]   YOB: 1965    DATE OF ADMISSION: 3/10/2020 10:22 AM    DATE OF DISCHARGE: 3/12/2020    PRIMARY CARE PROVIDER: Gertrude Severin, MD     ATTENDING PHYSICIAN: Keeley Adame MD  DISCHARGING PROVIDER: Steve Batista MD    To contact this individual call [phone] and ask the  to page. If unavailable ask to be transferred the Adult Hospitalist Department. DISCHARGE DIAGNOSES     cocaine induced chest pain      CONSULTATIONS: None    PROCEDURES/SURGERIES: * No surgery found *    PENDING TEST RESULTS:   At the time of discharge the following test results are still pending: none    FOLLOW UP APPOINTMENTS:   Follow-up Information     Follow up With Specialties Details Why Contact Info    Other, MD Rayna    Patient can only remember the practice name and not the physician             ADDITIONAL CARE RECOMMENDATIONS:   · It is important that you take the medication exactly as they are prescribed. · Keep your medication in the bottles provided by the pharmacist and keep a list of the medication names, dosages, and times to be taken in your wallet. · Do not take other medications without consulting your doctor. · No drinking alcohol or driving car or operating machinery if you are on narcotic pain medications. Donot take sedating mediations if you are sleepy or confused.    · Fall Precautions  · Keep Well Hydrated  · Report to your medical provider if you feel you have  developed allergies to medications  · Follow up with your PCP or Consultant for medication adjustments and refills  · Monitor for signs of fevers,chills,bleeding,chest pain and seek medical attention if you do so. ·          DIET: Cardiac Diet      ACTIVITY: Activity as tolerated    WOUND CARE: none    EQUIPMENT needed: none      DISCHARGE MEDICATIONS:   See Medication Reconciliation Form    · It is important that you take the medication exactly as they are prescribed. · Keep your medication in the bottles provided by the pharmacist and keep a list of the medication names, dosages, and times to be taken in your wallet. · Do not take other medications without consulting your doctor. NOTIFY YOUR PHYSICIAN FOR ANY OF THE FOLLOWING:   Fever over 101 degrees for 24 hours. Chest pain, shortness of breath, fever, chills, nausea, vomiting, diarrhea, change in mentation, falling, weakness, bleeding. Severe pain or pain not relieved by medications. Or, any other signs or symptoms that you may have questions about.       DISPOSITION:   x Home With:   OT  PT  HH  RN       SNF/Inpatient Rehab/LTAC    Independent/assisted living    Hospice    Other:     CDMP Checked:   Yes x     PROBLEM LIST Updated:  Yes x       Signed:   Phillip Kenny MD  3/12/2020  1:54 PM

## 2020-03-12 NOTE — PROGRESS NOTES
Transition Plan of Care  Attending is discharging today. Met with patient at the bedside and she can not pay for prescriptions. CM faxed to pharmacy and will be paid for by CM department.     Boni Mitchell RN CRM  Ext [phone]

## 2020-03-12 NOTE — CONSULTS
Cardiology Consult Note      Patient Name: Fidencio Sanchez  : 1965 MRN: [id number]  Date: 3/12/2020  Time: 4:54 PM    Admit Diagnosis: Chest pain [R07.9]  Chest pain [R07.9]  Chest pain [R07.9]    Primary Cardiologist: none   Consulting Cardiologist: Ashleigh Candelario MD    Reason for Consult: CP    Requesting MD: Seng Huddleston MD    HPI:  Fidencio Sanchez is a 53 y.o. female admitted on 3/10/2020  for Chest pain [R07.9]  Chest pain [R07.9]  Chest pain [R07.9]. has a past medical history of Hypertension. Presented to the ED for CP. She states she came for high BP. She is + for Cocaine on UDS. She now states she had chest pain again. It is TTP and worse with deep breathing. Her BP is elevated, 591+ systolic. She states she gets CP when her BP is high. MI 6 years ago, seen at [facility] for this. She states \"its been too long off Cocaine to be withdrawing\"    Subjective:  Received writhing. Clutching chest.      Assessment and Plan     1. Chest pain - atypical   - TTP and pleuritic   - Troponin normal   - EKG NSR   - attempt stress echo in am  2. HTN urgency   - Stop Nitro patch   - Lopressor IV 5 mg now   - Hydralazine and Cozaar added   - Continue Coreg and Cardizem   - IVF stoppped  3. ?Afib   - On Cardizem IR    - I cannot find and EKG or telemetry strip which demonstrates Afib  4. Cocaine abuse   - per Primary team   - recommend CIWA protocol    Atypical chest pain as above. Stress echo ordered for the morning. Will try to attempt. BP meds as ordered. Cardiology Attending:Patient seen and examined. I agree with NP assessment and plans. Pain to palpation. Bhumi Murphy MD 3/13/2020 10:24 AM         Patient Active Problem List   Diagnosis Code    Chest pain R07.9     No specialty comments available.       Review of Systems:    [] Patient unable to provide secondary to condition    [x] All systems negative, except as checked below.  Constitutional:    []Weight Change  []Fever   []Chills   []Night Sweats  []Fatigue  []Malaise  []____  ENT/Mouth:     []Hearing Changes  []Ear Pain  []Nasal Congestion   []Sinus Pain  []Hoarseness   []Sore throat  []Rhinorrhea  []Swallowing Difficulty  []____  Eyes:    []Eye Pain  []Swelling  []Redness  []Foreign Body  []Discharge  []Vision Changes  []____  Cardiovascular:    [x]Chest Pain  []SOB  []PND  []SANCHEZ  []Orthopnea  []Claudication  []Edema   []Palpitations  []____  Respiratory:    []Cough  []Sputum  []Wheezing,  []SOB  []Hemoptysis  []____  Gastrointestinal:    []Nausea  []Vomiting  []Diarrhea  []Constipation  []Pain  []Heartburn  []Anorexia  []Dysphagia  []Hematochezia  []Melena,  []Jaundice  []____  Genitourinary:    []Dysuria  []Urinary Frequency  []Hematuria  []Urinary Incontinence  []Urgency  []Flank Pain  []Hesitancy  []____  Musculoskeletal:    []Arthralgias  []Myalgias  []Joint Swelling  []Joint Stiffness  []Back Pain  []Neck Pain  []____  Skin:    []Skin Lesions  []Pruritis  []Hair Changes  []Skin rashes  []____  Neuro:    []Weakness  []Numbness  []Paresthesias  []Loss of Consciousness  []Syncope   []Dizziness  []Headache  []Coordination Changes  []Recent Falls  []____  Psych:    []Anxiety/Depression  []Insomnia  []Memory Changes  []Violence/Abuse Hx.  []____  Heme/Lymph:    []Bruising  []Bleeding  []Lymphadenopathy  []____  Endocrine:    []Polyuria  []Polydipsia  []Temperature Intolerance  []____         Previous treatment/evaluation includes   Echocardiogram - TTE/SOLITARIO  Cardiac risk factors:   Cocaine use. Past Medical History:   Diagnosis Date    Hypertension      History reviewed. No pertinent surgical history.   Current Facility-Administered Medications   Medication Dose Route Frequency    morphine injection 1 mg  1 mg IntraVENous Q4H PRN    metoprolol (LOPRESSOR) injection 5 mg  5 mg IntraVENous ONCE    hydrALAZINE (APRESOLINE) tablet 25 mg  25 mg Oral TID    [START ON 3/13/2020] losartan (COZAAR) tablet 25 mg  25 mg Oral DAILY    dilTIAZem (CARDIZEM) IR tablet 90 mg  90 mg Oral TIDAC    promethazine (PHENERGAN) 12.5 mg in NS 50 mL IVPB  12.5 mg IntraVENous Q6H PRN    LORazepam (ATIVAN) injection 2 mg  2 mg IntraVENous Q1H PRN    ondansetron (ZOFRAN) injection 4 mg  4 mg IntraVENous Q4H PRN    carvediloL (COREG) tablet 6.25 mg  6.25 mg Oral BID WITH MEALS    diazePAM (VALIUM) injection 5 mg  5 mg IntraVENous Q4H PRN    0.9% sodium chloride 1,000 mL with mvi, adult no. 4 with vit K 10 mL, thiamine 945 mg, folic acid 1 mg infusion   IntraVENous DAILY    cloNIDine (CATAPRES) 0.2 mg/24 hr patch 1 Patch  1 Patch TransDERmal Q7D    enoxaparin (LOVENOX) injection 40 mg  40 mg SubCUTAneous Q24H       Allergies   Allergen Reactions    Compazine [Prochlorperazine] Swelling      History reviewed. No pertinent family history. Social History     Socioeconomic History    Marital status: SINGLE     Spouse name: Not on file    Number of children: Not on file    Years of education: Not on file    Highest education level: Not on file   Tobacco Use    Smoking status: Light Tobacco Smoker    Smokeless tobacco: Never Used   Substance and Sexual Activity    Alcohol use: No       Objective:    Physical Exam    Vitals:   Vitals:    03/12/20 0739 03/12/20 0829 03/12/20 1258 03/12/20 1610   BP: 103/65 91/56 101/70 (!) 198/104   Pulse: 71 67 70 98   Resp: 19  18 16   Temp: 97.7 °F (36.5 °C)  97.5 °F (36.4 °C) 98.5 °F (36.9 °C)   SpO2: 100%  98% 100%   Weight:       Height:           General:    Alert, cooperative, no distress, appears stated age. Neck:   Supple, no carotid bruit and no JVD. Back:     Symmetric, normal curvature. Lungs:     Clear to auscultation bilaterally. Heart[de-identified]    Regular rate and rhythm, S1, S2 normal, no murmur, click, rub or gallop. Abdomen:     Soft, non-tender. Bowel sounds normal.    Extremities:   Extremities normal, atraumatic, no cyanosis or edema. Vascular:   Pulses - 2+ radials   Skin:   Skin color normal. No rashes or lesions   Neurologic:   CN II-XII grossly intact.         Telemetry: normal sinus rhythm    ECG:   EKG Results     Procedure 720 Value Units Date/Time    EKG, 12 LEAD, INITIAL [[id number]] Collected:  03/12/20 1615    Order Status:  Completed Updated:  03/12/20 1617     Ventricular Rate 91 BPM      Atrial Rate 91 BPM      P-R Interval 126 ms      QRS Duration 76 ms      Q-T Interval 364 ms      QTC Calculation (Bezet) 447 ms      Calculated P Axis 63 degrees      Calculated R Axis 52 degrees      Calculated T Axis 74 degrees      Diagnosis --     Normal sinus rhythm with sinus arrhythmia  When compared with ECG of 10-MAR-2020 22:39,  No significant change was found      EKG, 12 LEAD, INITIAL [[id number]] Collected:  03/10/20 2239    Order Status:  Completed Updated:  03/11/20 1739     Ventricular Rate 117 BPM      Atrial Rate 117 BPM      P-R Interval 146 ms      QRS Duration 74 ms      Q-T Interval 366 ms      QTC Calculation (Bezet) 510 ms      Calculated P Axis 70 degrees      Calculated R Axis 52 degrees      Calculated T Axis 72 degrees      Diagnosis --     Sinus tachycardia  Biatrial enlargement  Nonspecific ST abnormality  When compared with ECG of 10-MAR-2020 10:37,  No significant change was found  Confirmed by Aubrey Sparks MD, Pipe Sparks ([id number]) on 3/11/2020 5:39:31 PM      EKG 12 LEAD INITIAL [[id number]] Collected:  03/10/20 1037    Order Status:  Completed Updated:  03/11/20 1739     Ventricular Rate 88 BPM      Atrial Rate 88 BPM      P-R Interval 134 ms      QRS Duration 86 ms      Q-T Interval 400 ms      QTC Calculation (Bezet) 484 ms      Calculated P Axis 60 degrees      Calculated R Axis 20 degrees      Calculated T Axis 79 degrees      Diagnosis --     Normal sinus rhythm with sinus arrhythmia  Biatrial enlargement  Prolonged QT  When compared with ECG of 16-MAY-2017 00:47,  premature ventricular complexes are no longer present  Confirmed by Criss Carlos MD, Jose E Almas ([id number]) on 3/11/2020 5:39:24 PM      EKG, 12 LEAD, INITIAL [[id number]]     Order Status:  Canceled         normal EKG, normal sinus rhythm    Data Review:     Radiology:   XR Results (most recent):  Results from [place] encounter on 03/10/20   XR CHEST PORT    Narrative Portable chest single view dated 3/10/2020    History is chest pain    A single portable AP semierect view of the chest was obtained. The cardiac  silhouette is grossly normal in size. There is hyperaeration of the lungs. There  is no evidence of active lung disease. Impression IMPRESSION: No evidence of active lung disease. Recent Labs     03/11/20  0713 03/10/20  1505 03/10/20  1055     --   --    TROIQ  --  <0.05 <0.05     Recent Labs     03/12/20  0425 03/11/20  0713    136   K 3.6 4.5   * 105   CO2 23 22   BUN 26* 29*   CREA 0.84 0.96   GLU 95 115*   PHOS 3.6  --    CA 8.3* 9.5     Recent Labs     03/12/20  0425 03/11/20  1317   WBC 8.3 10.4   HGB 13.8 14.1   HCT 44.0 43.9    302     Recent Labs     03/12/20  0425 03/10/20  1055   SGOT 16 23   AP 72 109     No results for input(s): CHOL, LDLC in the last 72 hours. No lab exists for component: TGL, HDLC,  HBA1C  No results for input(s): CRP, TSH, TSHEXT in the last 72 hours. No lab exists for component: ESR    Devan Sandoval. Riccardo Boxer, MD         [facility] [address] [place] [address] [address]     [facility], [facility]     [phone]    CC: Joslyn, MD Rayna

## 2020-03-13 VITALS
WEIGHT: 119.93 LBS | TEMPERATURE: 98.4 F | OXYGEN SATURATION: 96 % | BODY MASS INDEX: 22.64 KG/M2 | HEART RATE: 68 BPM | DIASTOLIC BLOOD PRESSURE: 65 MMHG | RESPIRATION RATE: 18 BRPM | SYSTOLIC BLOOD PRESSURE: 105 MMHG | HEIGHT: 61 IN

## 2020-03-13 LAB
ATRIAL RATE: 91 BPM
CALCULATED P AXIS, ECG09: 63 DEGREES
CALCULATED R AXIS, ECG10: 52 DEGREES
CALCULATED T AXIS, ECG11: 74 DEGREES
DIAGNOSIS, 93000: NORMAL
P-R INTERVAL, ECG05: 126 MS
Q-T INTERVAL, ECG07: 364 MS
QRS DURATION, ECG06: 76 MS
QTC CALCULATION (BEZET), ECG08: 447 MS
VENTRICULAR RATE, ECG03: 91 BPM

## 2020-03-13 PROCEDURE — 99218 HC RM OBSERVATION: CPT

## 2020-03-13 PROCEDURE — 96376 TX/PRO/DX INJ SAME DRUG ADON: CPT

## 2020-03-13 PROCEDURE — 74011250636 HC RX REV CODE- 250/636: Performed by: INTERNAL MEDICINE

## 2020-03-13 PROCEDURE — 74011000250 HC RX REV CODE- 250: Performed by: INTERNAL MEDICINE

## 2020-03-13 PROCEDURE — 74011250637 HC RX REV CODE- 250/637: Performed by: PHYSICIAN ASSISTANT

## 2020-03-13 PROCEDURE — 74011250637 HC RX REV CODE- 250/637: Performed by: INTERNAL MEDICINE

## 2020-03-13 RX ORDER — AMLODIPINE BESYLATE 2.5 MG/1
2.5 TABLET ORAL DAILY
Qty: 30 TAB | Refills: 1 | Status: SHIPPED | OUTPATIENT
Start: 2020-03-13 | End: 2020-03-13

## 2020-03-13 RX ORDER — HYDRALAZINE HYDROCHLORIDE 10 MG/1
10 TABLET, FILM COATED ORAL 3 TIMES DAILY
Status: DISCONTINUED | OUTPATIENT
Start: 2020-03-13 | End: 2020-03-13

## 2020-03-13 RX ORDER — METOPROLOL SUCCINATE 50 MG/1
50 TABLET, EXTENDED RELEASE ORAL DAILY
Status: DISCONTINUED | OUTPATIENT
Start: 2020-03-13 | End: 2020-03-13

## 2020-03-13 RX ORDER — AMLODIPINE BESYLATE 5 MG/1
2.5 TABLET ORAL DAILY
Status: DISCONTINUED | OUTPATIENT
Start: 2020-03-13 | End: 2020-03-13

## 2020-03-13 RX ORDER — LOSARTAN POTASSIUM 50 MG/1
50 TABLET ORAL DAILY
Qty: 30 TAB | Refills: 1 | Status: SHIPPED | OUTPATIENT
Start: 2020-03-13 | End: 2020-03-13

## 2020-03-13 RX ORDER — METOPROLOL SUCCINATE 50 MG/1
50 TABLET, EXTENDED RELEASE ORAL DAILY
Qty: 30 TAB | Refills: 1 | Status: SHIPPED | OUTPATIENT
Start: 2020-03-13 | End: 2020-03-13

## 2020-03-13 RX ORDER — LOSARTAN POTASSIUM 50 MG/1
50 TABLET ORAL DAILY
Status: DISCONTINUED | OUTPATIENT
Start: 2020-03-13 | End: 2020-03-13

## 2020-03-13 RX ORDER — CLONIDINE 0.2 MG/24H
1 PATCH, EXTENDED RELEASE TRANSDERMAL
Qty: 5 PATCH | Refills: 1 | Status: SHIPPED | OUTPATIENT
Start: 2020-03-17

## 2020-03-13 RX ADMIN — FOLIC ACID: 5 INJECTION, SOLUTION INTRAMUSCULAR; INTRAVENOUS; SUBCUTANEOUS at 09:21

## 2020-03-13 RX ADMIN — HYDRALAZINE HYDROCHLORIDE 25 MG: 25 TABLET, FILM COATED ORAL at 00:43

## 2020-03-13 NOTE — PROGRESS NOTES
Problem: Hypertension  Goal: *Blood pressure within specified parameters  Outcome: Progressing Towards Goal   Pt awake and alert with initial assessment. PO hydralazine given. Pts blood pressure 101/69. Pt not reporting any pain at this time. Bedside shift change report given to Elly RN (oncoming nurse) by Dean Rainey RN (offgoing nurse). Report included the following information SBAR, Kardex, ED Summary, Procedure Summary, Intake/Output, MAR, Accordion, Recent Results, Med Rec Status, Cardiac Rhythm NSR and Alarm Parameters .

## 2020-03-13 NOTE — PROGRESS NOTES
0000: Bedside and Verbal shift change report given to Vladimir Andersen RN (oncoming nurse) by MARY ANN Mg (offgoing nurse). Report included the following information SBAR, Kardex, Intake/Output, MAR, Accordion, Recent Results, Med Rec Status and Cardiac Rhythm NSR/Sinus Tach.      Hourly rounds completed throughout shift

## 2020-03-13 NOTE — PROGRESS NOTES
Cardiology Progress Note            Admit Date: 3/10/2020  Admit Diagnosis: Chest pain [R07.9]  Chest pain [R07.9]  Chest pain [R07.9]  Date: 3/13/2020     Time: 8:51 AM    Subjective:  Feeling better. No c/o CP. Pain comes with elevated BP     Assessment and Plan     1. Chest pain - atypical              - TTP and pleuritic              - Troponin normal              - EKG NSR              - Absent CP when BP is normalized. 2. HTN    - Coreg stopped   - Toprol XL, Cozaar, Norvasc and Catapres patch - continue on discharge. 3. ?Afib              - No clear evidence for Afib -    - Dilt stopped. 4. Cocaine abuse              - per Primary team              - recommend CIWA protocol     No chest pain when BP is normalized. Cancelled stress test.  BP meds adjusted and continue on discharge. D/w her about labile BP and Cocaine use. OK for discharge from Cardiology standpoint. Follow up as below with PCP for continued BP management. Cardiology Attending:Patient seen and examined. I agree with NP assessment and plans. Meds adjusted, OK to release. Sukumar Vargas MD 3/13/2020 10:29 AM         Future Appointments   Date Time Provider Vaishali Peteri   3/19/2020  9:00 AM Davis Garcias MD [address] [place] [address]         No results found for: ROSCOE   Past Medical History:   Diagnosis Date    Hypertension       Social History     Tobacco Use    Smoking status: Light Tobacco Smoker    Smokeless tobacco: Never Used   Substance Use Topics    Alcohol use: No    Drug use: Not on file           Review of Systems:    [] Patient unable to provide secondary to condition    [x] All systems negative, except as checked below.   Constitutional:    []Weight Change  []Fever   []Chills   []Night Sweats  []Fatigue  []Malaise  []____  ENT/Mouth:     []Hearing Changes  []Ear Pain  []Nasal Congestion   []Sinus Pain  []Hoarseness   []Sore throat []Rhinorrhea  []Swallowing Difficulty  []____  Eyes:    []Eye Pain  []Swelling  []Redness  []Foreign Body  []Discharge  []Vision Changes  []____  Cardiovascular:    []Chest Pain  []SOB  []PND  []SANCHEZ  []Orthopnea  []Claudication  []Edema   []Palpitations  []____  Respiratory:    []Cough  []Sputum  []Wheezing,  []SOB  []Hemoptysis  []____  Gastrointestinal:    []Nausea  []Vomiting  []Diarrhea  []Constipation  []Pain  []Heartburn  []Anorexia  []Dysphagia  []Hematochezia  []Melena,  []Jaundice  []____  Genitourinary:    []Dysuria  []Urinary Frequency  []Hematuria  []Urinary Incontinence  []Urgency  []Flank Pain  []Hesitancy  []____  Musculoskeletal:    []Arthralgias  []Myalgias  []Joint Swelling  []Joint Stiffness  []Back Pain  []Neck Pain  []____  Skin:    []Skin Lesions  []Pruritis  []Hair Changes  []Skin rashes  []____  Neuro:    []Weakness  []Numbness  []Paresthesias  []Loss of Consciousness  []Syncope   []Dizziness  []Headache  []Coordination Changes  []Recent Falls  []____  Psych:    []Anxiety/Depression  []Insomnia  []Memory Changes  []Violence/Abuse Hx.  []____  Heme/Lymph:    []Bruising  []Bleeding  []Lymphadenopathy  []____  Endocrine:    []Polyuria  []Polydipsia  []Temperature Intolerance  []____         Objective:     Physical Exam:                Visit Vitals  BP 95/72 (BP 1 Location: Left arm, BP Patient Position: At rest)   Pulse 60   Temp 98.2 °F (36.8 °C)   Resp 16   Ht 5' 1\" (1.549 m)   Wt 119 lb 14.9 oz (54.4 kg)   SpO2 99%   BMI 22.66 kg/m²        General Appearance:   Well developed, well nourished,alert and oriented x 3, and   individual in no acute distress. Ears/Nose/Mouth/Throat:    Hearing grossly normal.         Neck:  Supple. Chest:    Lungs clear to auscultation bilaterally. Cardiovascular:   Regular rate and rhythm, S1, S2 normal, no murmur. Abdomen:    Soft, non-tender, bowel sounds are active. Extremities:  No edema bilaterally. Skin:  Warm and dry.      Telemetry: normal sinus rhythm     Data Review:    Labs:    Recent Results (from the past 24 hour(s))   EKG, 12 LEAD, INITIAL    Collection Time: 03/12/20  4:15 PM   Result Value Ref Range    Ventricular Rate 91 BPM    Atrial Rate 91 BPM    P-R Interval 126 ms    QRS Duration 76 ms    Q-T Interval 364 ms    QTC Calculation (Bezet) 447 ms    Calculated P Axis 63 degrees    Calculated R Axis 52 degrees    Calculated T Axis 74 degrees    Diagnosis       Normal sinus rhythm with sinus arrhythmia  When compared with ECG of 10-MAR-2020 22:39,  No significant change was found  Confirmed by Tyrone Enamorado MD, Caleb Ceron ([id number]) on 3/13/2020 8:26:07 AM            Radiology:        Current Facility-Administered Medications   Medication Dose Route Frequency    losartan (COZAAR) tablet 50 mg  50 mg Oral DAILY    metoprolol succinate (TOPROL-XL) XL tablet 50 mg  50 mg Oral DAILY    amLODIPine (NORVASC) tablet 2.5 mg  2.5 mg Oral DAILY    morphine injection 1 mg  1 mg IntraVENous Q4H PRN    hydrALAZINE (APRESOLINE) 20 mg/mL injection 20 mg  20 mg IntraVENous Q4H PRN    promethazine (PHENERGAN) 12.5 mg in NS 50 mL IVPB  12.5 mg IntraVENous Q6H PRN    LORazepam (ATIVAN) injection 2 mg  2 mg IntraVENous Q1H PRN    ondansetron (ZOFRAN) injection 4 mg  4 mg IntraVENous Q4H PRN    diazePAM (VALIUM) injection 5 mg  5 mg IntraVENous Q4H PRN    0.9% sodium chloride 1,000 mL with mvi, adult no. 4 with vit K 10 mL, thiamine 355 mg, folic acid 1 mg infusion   IntraVENous DAILY    cloNIDine (CATAPRES) 0.2 mg/24 hr patch 1 Patch  1 Patch TransDERmal Q7D    enoxaparin (LOVENOX) injection 40 mg  40 mg SubCUTAneous Q24H       Kofi Us MD     [facility] [address] [place] [address], [address] [place] [address] [address]   [address] [place] [address]   [phone]

## 2020-03-13 NOTE — PROGRESS NOTES
Problem: Falls - Risk of  Goal: *Absence of Falls  Description: Document Abisai Ernandez Fall Risk and appropriate interventions in the flowsheet. Outcome: Progressing Towards Goal  Note: Fall Risk Interventions:     Medication Interventions: Assess postural VS orthostatic hypotension, Evaluate medications/consider consulting pharmacy, Patient to call before getting OOB    Elimination Interventions: Call light in reach, Elevated toilet seat     Problem: Patient Education: Go to Patient Education Activity  Goal: Patient/Family Education  Outcome: Progressing Towards Goal     Problem: Pain  Goal: *Control of Pain  Outcome: Progressing Towards Goal  Goal: *PALLIATIVE CARE:  Alleviation of Pain  Outcome: Progressing Towards Goal     Problem: Hypertension  Goal: *Blood pressure within specified parameters  Outcome: Progressing Towards Goal  Goal: *Fluid volume balance  Outcome: Progressing Towards Goal  Goal: *Labs within defined limits  Outcome: Progressing Towards Goal     Problem: Patient Education: Go to Patient Education Activity  Goal: Patient/Family Education  Outcome: Progressing Towards Goal     13:00 I have reviewed discharge instructions with the patient. The patient verbalized understanding. Current Discharge Medication List      START taking these medications    Details   cloNIDine (CATAPRES) 0.2 mg/24 hr ptwk 1 Patch by TransDERmal route every seven (7) days. Qty: 5 Patch, Refills: 1      atorvastatin (LIPITOR) 40 mg tablet Take 1 Tab by mouth daily. Qty: 30 Tab, Refills: 0         CONTINUE these medications which have NOT CHANGED    Details   aspirin 81 mg chewable tablet Take 81 mg by mouth daily. promethazine (PHENERGAN) 25 mg tablet Take 1 Tab by mouth every six (6) hours as needed.   Qty: 12 Tab, Refills: 0         STOP taking these medications       amLODIPine-atorvastatin (CADUET) 10-10 mg per tablet Comments:   Reason for Stopping:

## 2020-03-13 NOTE — DISCHARGE SUMMARY
Discharge Summary       PATIENT ID: Justin Frank  MRN: [id number]   YOB: 1965    DATE OF ADMISSION: 3/10/2020 10:22 AM    DATE OF DISCHARGE: 3/12/20  PRIMARY CARE PROVIDER: Jhonny Hutchison MD     ATTENDING PHYSICIAN: Phan Esposito MD    DISCHARGING PROVIDER: Phan Esposito MD    To contact this individual call [phone] and ask the  to page. If unavailable ask to be transferred the Adult Hospitalist Department. CONSULTATIONS: IP CONSULT TO CARDIOLOGY    PROCEDURES/SURGERIES: * No surgery found *    ADMITTING DIAGNOSES & HOSPITAL COURSE:      53 y.o. female with past medical history significant for HTN, and MI who presents via EMS with chief complaint of chest pain. Pt complains of sudden onset L chest pain that feels like pressure, and radiates to both of her underarms, that began this morning at 06:00. Her pain is worse with palpation, and she states this pain feels similar to her previous MI 6 years ago. Pt does not follow with a cardiologist at this time, but states she was seen for her MI at [facility]. She also endorses nausea, vomiting, shortness of breath, lightheadedness, dizziness, and urinary frequency. She denies leg pain, and leg swelling. She has a history of cocaine abuse, and states she last used it 2 days ago, and she does not use it regularly. Pt was hypertensive and diaphoretic for EMS, with pressure in the 599'W systolic, but was improved slightly with nitropaste. She did not receive aspirin form EMS. Pt is on amlodipine for her hypertension, and last took it yesterday. There are no other acute medical concerns at this time.  Endorses tobacco smoking; denies EtOH use; endorses cocaine use.     Chest pain likely due to cocaine abuse, hx of MI 6 years ago  -last cocaine use was 3 days ago  -chest pain likely due to cocaine induced coronary spasm and BP  -continue on diltiazem in hospital, can not afford diltiazem, will only continue Coreg  -troponin <0.05 x 2  -EKG sinus tachycardia vent rate 117 non specific st t wave non specific st t wave  -d dimer 0.64 normal  -toxicology screening positive for cocaine   -chest x ray no evidence of acute lung disease   - Echo normal cavity size, systolic function (ejection fraction normal) and diastolic function. Mildly increased wall thickness. Estimated left ventricular ejection fraction is 65 - 70%. No regional wall motion abnormality noted.     HTN urgency  Resolved  On lower side now  -BP improving,   - continue clonidine patch 0.2/day every 7 days on discharge     Tobacco and cocaine use   -advised to stop alcohol, cocaine and tobacco use      Alcohol abuse  -continue iv folic acid, thiamin and mvi  -lorazepam per [facility] protocol          DISCHARGE DIAGNOSES / PLAN:        Hypertensive urgency  Continue clonidine patch 0.2 mg/24 hrs every 7 days  Follow up with PCP              PENDING TEST RESULTS:   At the time of discharge the following test results are still pending: none    FOLLOW UP APPOINTMENTS:    Follow-up Information     Follow up With Specialties Details Why Contact Info    Hossein Mas MD Family Practice On 3/19/2020 Hospital f/u new PCP appointment Thursday, 3/19/20 @ 9:00 a.m. Please arrive 15 minutes early with photo ID, insurance card and a listing of all medications. [address] [address]      Rayna Jorgensen MD    Patient can only remember the practice name and not the physician               ADDITIONAL CARE RECOMMENDATIONS:   · It is important that you take the medication exactly as they are prescribed. · Keep your medication in the bottles provided by the pharmacist and keep a list of the medication names, dosages, and times to be taken in your wallet. · Do not take other medications without consulting your doctor. · No drinking alcohol or driving car or operating machinery if you are on narcotic pain medications.  Donot take sedating mediations if you are sleepy or confused. · Fall Precautions  · Keep Well Hydrated  · Report to your medical provider if you feel you have  developed allergies to medications  · Follow up with your PCP or Consultant for medication adjustments and refills  · Monitor for signs of fevers,chills,bleeding,chest pain and seek medical attention if you do so. ·          DIET: Cardiac Diet      ACTIVITY: Activity as tolerated    WOUND CARE: none    EQUIPMENT needed: none          DISCHARGE MEDICATIONS:  Current Discharge Medication List      START taking these medications    Details   cloNIDine (CATAPRES) 0.2 mg/24 hr ptwk 1 Patch by TransDERmal route every seven (7) days. Qty: 5 Patch, Refills: 1      atorvastatin (LIPITOR) 40 mg tablet Take 1 Tab by mouth daily. Qty: 30 Tab, Refills: 0         CONTINUE these medications which have NOT CHANGED    Details   aspirin 81 mg chewable tablet Take 81 mg by mouth daily. promethazine (PHENERGAN) 25 mg tablet Take 1 Tab by mouth every six (6) hours as needed. Qty: 12 Tab, Refills: 0         STOP taking these medications       amLODIPine-atorvastatin (CADUET) 10-10 mg per tablet Comments:   Reason for Stopping:                 NOTIFY YOUR PHYSICIAN FOR ANY OF THE FOLLOWING:   Fever over 101 degrees for 24 hours. Chest pain, shortness of breath, fever, chills, nausea, vomiting, diarrhea, change in mentation, falling, weakness, bleeding. Severe pain or pain not relieved by medications. Or, any other signs or symptoms that you may have questions about.     DISPOSITION:   x Home With:   OT  PT  HH  RN       Long term SNF/Inpatient Rehab    Independent/assisted living    Hospice    Other:       PATIENT CONDITION AT DISCHARGE:     Functional status    Poor     Deconditioned    x Independent      Cognition     Lucid     Forgetful     Dementia      Catheters/lines (plus indication)    Barros     PICC     PEG     None      Code status    x Full code     DNR      PHYSICAL EXAMINATION AT DISCHARGE:  General: Alert, cooperative, no distress, appears stated age. HEENT:           Atraumatic, anicteric sclerae                           Lungs:             Clear to auscultation bilaterally. No Wheezing or Rhonchi. No rales  Chest wall:      No tenderness  No Accessory muscle use. Heart:              Regular  rhythm,  No  murmur   No edema  Abdomen:        Soft, non-tender. Not distended. Bowel sounds normal  Extremities:     No cyanosis. No clubbing,                            Skin turgor normal, Capillary refill normal    Psych:             Not anxious or agitated.   Neurologic:      Alert, moves all extremities, answers questions appropriately and responds to commands       CHRONIC MEDICAL DIAGNOSES:  Problem List as of 3/13/2020 Never Reviewed          Codes Class Noted - Resolved    Chest pain ICD-10-CM: R07.9  ICD-9-CM: 786.50  3/10/2020 - Present              Greater than 45 minutes were spent with the patient on counseling and coordination of care    Signed:   Omega Huddleston MD  3/13/2020  1:57 PM

## 2020-03-13 NOTE — PROGRESS NOTES
Problem: Falls - Risk of  Goal: *Absence of Falls  Description: Document Nicol Quikc Fall Risk and appropriate interventions in the flowsheet.   Outcome: Progressing Towards Goal  Note: Fall Risk Interventions:            Medication Interventions: Patient to call before getting OOB, Teach patient to arise slowly

## 2022-03-20 PROBLEM — R07.9 CHEST PAIN: Status: ACTIVE | Noted: 2020-03-10

## 2023-05-17 RX ORDER — ATORVASTATIN CALCIUM 40 MG/1
40 TABLET, FILM COATED ORAL DAILY
COMMUNITY
Start: 2020-03-12

## 2023-05-17 RX ORDER — ASPIRIN 81 MG/1
81 TABLET, CHEWABLE ORAL DAILY
COMMUNITY

## 2023-05-17 RX ORDER — CLONIDINE 0.2 MG/24H
1 PATCH, EXTENDED RELEASE TRANSDERMAL
COMMUNITY
Start: 2020-03-17

## 2023-05-17 RX ORDER — PROMETHAZINE HYDROCHLORIDE 25 MG/1
25 TABLET ORAL EVERY 6 HOURS PRN
COMMUNITY
Start: 2017-05-15